# Patient Record
Sex: FEMALE | Race: WHITE | NOT HISPANIC OR LATINO | Employment: FULL TIME | URBAN - METROPOLITAN AREA
[De-identification: names, ages, dates, MRNs, and addresses within clinical notes are randomized per-mention and may not be internally consistent; named-entity substitution may affect disease eponyms.]

---

## 2017-01-03 ENCOUNTER — HOSPITAL ENCOUNTER (EMERGENCY)
Facility: CLINIC | Age: 57
Discharge: HOME | End: 2017-01-03
Attending: EMERGENCY MEDICINE | Admitting: EMERGENCY MEDICINE
Payer: COMMERCIAL

## 2017-01-03 ENCOUNTER — APPOINTMENT (OUTPATIENT)
Dept: GENERAL RADIOLOGY | Facility: CLINIC | Age: 57
End: 2017-01-03
Attending: EMERGENCY MEDICINE
Payer: COMMERCIAL

## 2017-01-03 VITALS
DIASTOLIC BLOOD PRESSURE: 92 MMHG | HEIGHT: 67 IN | BODY MASS INDEX: 28.25 KG/M2 | TEMPERATURE: 98.7 F | SYSTOLIC BLOOD PRESSURE: 149 MMHG | RESPIRATION RATE: 18 BRPM | OXYGEN SATURATION: 96 % | WEIGHT: 180 LBS

## 2017-01-03 DIAGNOSIS — S52.614A CLOSED NONDISPLACED FRACTURE OF STYLOID PROCESS OF RIGHT ULNA, INITIAL ENCOUNTER: ICD-10-CM

## 2017-01-03 DIAGNOSIS — S52.551A OTHER CLOSED EXTRA-ARTICULAR FRACTURE OF DISTAL END OF RIGHT RADIUS, INITIAL ENCOUNTER: ICD-10-CM

## 2017-01-03 PROCEDURE — 99284 EMERGENCY DEPT VISIT MOD MDM: CPT | Mod: 25

## 2017-01-03 PROCEDURE — 25000132 ZZH RX MED GY IP 250 OP 250 PS 637: Performed by: EMERGENCY MEDICINE

## 2017-01-03 PROCEDURE — 73110 X-RAY EXAM OF WRIST: CPT | Mod: RT

## 2017-01-03 PROCEDURE — 29125 APPL SHORT ARM SPLINT STATIC: CPT | Mod: RT

## 2017-01-03 RX ORDER — OXYCODONE AND ACETAMINOPHEN 5; 325 MG/1; MG/1
1 TABLET ORAL ONCE
Status: COMPLETED | OUTPATIENT
Start: 2017-01-03 | End: 2017-01-03

## 2017-01-03 RX ORDER — IBUPROFEN 600 MG/1
600 TABLET, FILM COATED ORAL ONCE
Status: COMPLETED | OUTPATIENT
Start: 2017-01-03 | End: 2017-01-03

## 2017-01-03 RX ORDER — DOCUSATE SODIUM 100 MG/1
100 CAPSULE, LIQUID FILLED ORAL 2 TIMES DAILY
Qty: 20 CAPSULE | Refills: 0 | Status: SHIPPED | OUTPATIENT
Start: 2017-01-03 | End: 2020-03-23

## 2017-01-03 RX ORDER — OXYCODONE AND ACETAMINOPHEN 5; 325 MG/1; MG/1
1 TABLET ORAL EVERY 6 HOURS PRN
Qty: 15 TABLET | Refills: 0 | Status: SHIPPED | OUTPATIENT
Start: 2017-01-03 | End: 2020-03-23

## 2017-01-03 RX ADMIN — IBUPROFEN 600 MG: 600 TABLET ORAL at 21:54

## 2017-01-03 RX ADMIN — OXYCODONE HYDROCHLORIDE AND ACETAMINOPHEN 1 TABLET: 5; 325 TABLET ORAL at 22:36

## 2017-01-03 NOTE — ED AVS SNAPSHOT
[facility] Emergency Department    [address]    [facility] [address]    Phone:  [phone]    Fax:  [phone]                                       Kassidy Guajardo   MRN: [id number]    Department:  [facility] Emergency Department   Date of Visit:  1/3/2017           After Visit Summary Signature Page     I have received my discharge instructions, and my questions have been answered. I have discussed any challenges I see with this plan with the nurse or doctor.    ..........................................................................................................................................  Patient/Patient Representative Signature      ..........................................................................................................................................  Patient Representative Print Name and Relationship to Patient    ..................................................               ................................................  Date                                            Time    ..........................................................................................................................................  Reviewed by Signature/Title    ...................................................              ..............................................  Date                                                            Time

## 2017-01-03 NOTE — ED AVS SNAPSHOT
[facility] Emergency Department    [address]    [facility] [address]    Phone:  [phone]    Fax:  [phone]                                       Kassidy Guajardo   MRN: [id number]    Department:  [facility] Emergency Department   Date of Visit:  1/3/2017           Patient Information     Date Of Birth          1960        Your diagnoses for this visit were:     Other closed extra-articular fracture of distal end of right radius, initial encounter     Closed nondisplaced fracture of styloid process of right ulna, initial encounter        You were seen by Jovi Schofield MD.      Follow-up Information     Schedule an appointment as soon as possible for a visit with Celestino Lilly MD.    Specialty:  Orthopedics    Contact information:    [facility]  [address]  [facility] [address]  [phone]          Discharge Instructions         Forearm Fracture  You have a break or fracture of both bones in the forearm. The bones are not out of place and will not need to be set. This fracture usually takes 6 to 8 weeks to heal completely. Initial treatment is with a splint or cast.    Home care    Keep your arm elevated to reduce pain and swelling. When sitting or lying down elevate your arm above the level of your heart. You can do this by placing your arm on a pillow that rests on your chest or on a pillow at your side. This is most important during the first 48 hours after injury.    Apply an ice pack over the injured area for 15 to 20 minutes every 3 to 6 hours. You should do this for the first 24 to 48 hours. You can make an ice pack by filling a plastic bag that seals at the top with ice cubes and then wrapping it with a thin towel. Be careful not to injure your skin with the ice treatments. Ice should never be applied directly to skin. As the ice melts, be careful that the cast or splint doesn t get wet. You can place the ice pack  inside the sling and directly over the splint or cast. Continue with ice packs as needed for the relief of pain and swelling.    Keep the cast or splint completely dry at all times. Bathe with your cast or splint out of the water. Protect it with 2 large plastic bags, one outside of the other, each taped with duct tape at the top end. If a fiberglass splint or cast gets wet, you can dry it with a hair dryer on a cool setting.    You may use over-the-counter pain medicine to control pain, unless another pain medicine was prescribed. If you have chronic liver or kidney disease or ever had a stomach ulcer or GI bleeding, talk with your healthcare provider before using these medicines.  Follow-up care  Follow up with your healthcare provider, or as advised. If a splint was applied, it may be changed to a cast during your follow-up visit.  If X-rays were taken, you will be told of any new findings that may affect your care.  When to seek medical advice  Call your healthcare provider right away if any of the following occur:    The plaster cast or splint becomes wet or soft    The fiberglass cast or splint remains wet for more than 24 hours    Increased tightness, looseness, or pain occurs under the cast or splint    Fingers become swollen, cold, blue, numb or tingly    The cast develops a foul odor    7890-3876 [employer]. [address] [place] [address]. All rights reserved. This information is not intended as a substitute for professional medical care. Always follow your healthcare professional's instructions.          24 Hour Appointment Hotline       To make an appointment at any [facility], call [phone] ([phone]). If you don't have a family doctor or clinic, we will help you find one. [facility] clinics are conveniently located to serve the needs of you and your family.             Review of your medicines      START taking        Dose / Directions Last dose taken     docusate sodium 100 MG capsule   Commonly known as:  COLACE   Dose:  100 mg   Quantity:  20 capsule        Take 1 capsule (100 mg) by mouth 2 times daily   Refills:  0        oxyCODONE-acetaminophen 5-325 MG per tablet   Commonly known as:  PERCOCET   Dose:  1 tablet   Quantity:  15 tablet        Take 1 tablet by mouth every 6 hours as needed for moderate to severe pain   Refills:  0                Prescriptions were sent or printed at these locations (2 Prescriptions)                   Other Prescriptions                Printed at Department/Unit printer (2 of 2)         oxyCODONE-acetaminophen (PERCOCET) 5-325 MG per tablet               docusate sodium (COLACE) 100 MG capsule                Procedures and tests performed during your visit     Wrist XR, G/E 3 views, right      Orders Needing Specimen Collection     None      Pending Results     No orders found from 1/2/2017 to 1/4/2017.            Pending Culture Results     No orders found from 1/2/2017 to 1/4/2017.       Test Results from your hospital stay           1/3/2017 11:06 PM - Interface, Radiant Ib      Narrative     WRIST RIGHT THREE OR MORE VIEWS  1/3/2017 10:07 PM     COMPARISON: None    HISTORY: Wrist injury.        Impression     IMPRESSION: There is a transverse, mildly dorsally displaced fracture  through the distal metaphysis of the right radius. There is a  nondisplaced fracture through the base of the right ulnar styloid. No  other fractures noted.    BONNY VERDUZCO MD                Clinical Quality Measure: Blood Pressure Screening     Your blood pressure was checked while you were in the emergency department today. The last reading we obtained was  BP: (!) 151/101 mmHg . Please read the guidelines below about what these numbers mean and what you should do about them.  If your systolic blood pressure (the top number) is less than 120 and your diastolic blood pressure (the bottom number) is less than 80, then your blood pressure is normal.  "There is nothing more that you need to do about it.  If your systolic blood pressure (the top number) is 120-139 or your diastolic blood pressure (the bottom number) is 80-89, your blood pressure may be higher than it should be. You should have your blood pressure rechecked within a year by a primary care provider.  If your systolic blood pressure (the top number) is 140 or greater or your diastolic blood pressure (the bottom number) is 90 or greater, you may have high blood pressure. High blood pressure is treatable, but if left untreated over time it can put you at risk for heart attack, stroke, or kidney failure. You should have your blood pressure rechecked by a primary care provider within the next 4 weeks.  If your provider in the emergency department today gave you specific instructions to follow-up with your doctor or provider even sooner than that, you should follow that instruction and not wait for up to 4 weeks for your follow-up visit.        Thank you for choosing [facility]       Thank you for choosing [facility] for your care. Our goal is always to provide you with excellent care. Hearing back from our patients is one way we can continue to improve our services. Please take a few minutes to complete the written survey that you may receive in the mail after you visit with us. Thank you!        [employer] Information     "[employer] lets you send messages to your doctor, view your test results, renew your prescriptions, schedule appointments and more. To sign up, go to [email or web address] . Click on \"Log in\" on the left side of the screen, which will take you to the Welcome page. Then click on \"Sign up Now\" on the right side of the page.     You will be asked to enter the access code listed below, as well as some personal information. Please follow the directions to create your username and password.     Your access code is: [email or web address]  Expires: 4/3/2017 10:55 PM     Your access code will  in 90 days. " If you need help or a new code, please call your [facility] clinic or [phone].        Care EveryWhere ID     This is your Care EveryWhere ID. This could be used by other organizations to access your [facility] medical records  [id number]        After Visit Summary       This is your record. Keep this with you and show to your community pharmacist(s) and doctor(s) at your next visit.

## 2017-01-04 NOTE — ED NOTES
A&Ox4. ABC's intact. Pt c/o right wrist pain after falling on the ice.  Pain 8/10 at this time. Denies numbness/tingling.  CMS intact. Occurred at 2115

## 2017-01-04 NOTE — ED PROVIDER NOTES
"  History     Chief Complaint:  Wrist Pain      HPI   Kassidy Guajardo is a 56 year old female who presents with right wrist pain after a mechanical fall. The patient reports tonight one hour ago while taking out the trash she fell onto her right wrist. Since that time the patient has had wrist wrist pain radiating into her forearm. She was concerned given her pain which prompts her visit. She denies other pain or injury.     Allergies:  The patient has no known allergies to medications.      Medications:    The patient is not currently taking any prescribed medications.     Past Medical History:    CLL    Past Surgical History:    GI surgery    Family History:    The patient has no pertinent family history.     Social History:  .  The patient denies smoking.   The patient consumes alcohol.      Review of Systems   Musculoskeletal:        Positive for right wrist pain.    All other systems reviewed and are negative.    Physical Exam   First Vitals:  BP: (!) 146/102 mmHg  Heart Rate: 89  Temp: 98.7  F (37.1  C)  Resp: 18  Height: 170.2 cm (5' 7\")  Weight: 81.647 kg (180 lb)  SpO2: 100 %    Physical Exam   Vital signs and nursing notes reviewed.     Constitutional: laying on gurney appears comfortable  HENT: No evidence of facial or head injury.    Eyes: Conjunctivae are normal bilaterally. Pupils equal  Neck: normal range of motion  Cardiovascular: Normal rate.    Pulmonary/Chest: No respiratory distress.   Musculoskeletal: Tenderness and swelling at the right distal radius and ulnar area dorsally. Normal cap refill and sensation in all digits. No significant pain noted at elbow with range of motion.   Neurological: Alert and oriented. No focal weakness  Skin: Skin is warm and dry. No rash noted.   Psych: normal affect     Emergency Department Course   Imaging:  Radiographic findings were communicated with the patient who voiced understanding of the findings.    XR Right Wrist:   There is a transverse, mildly " dorsally displaced fracture  through the distal metaphysis of the right radius. There is a  nondisplaced fracture through the base of the right ulnar styloid. No  other fractures noted.    Interventions:  Advil 600 mg tablet  Percocet 1 tablet    Emergency Department Course:  Nursing notes and vitals reviewed.  I performed an exam of the patient as documented above.     The above imaging study(s) were ordered with results noted above.     The patient received the intervention(s) above.     The patient was placed in a splint.    Findings and plan explained to the Patient. Patient discharged home with instructions regarding supportive care, medications, and reasons to return. The importance of close follow-up was reviewed.     Impression & Plan    Medical Decision Making:  Kassidy Guajardo is a 56 year old female who presents for evaluation of wrist pain after fall. CMS is intact distally in the extremity.  Xrays reveal a fracture that does not need reduction at this time.  The patient/family understand that this may change with time and orthopedic follow-up is indicated.  There is no indication for ortho consultation from the ED. Rather, close follow-up is indicated in the next days.  Splint and fracture precautions for home.  The patients head to toe trauma exam is otherwise normal at this time and no further trauma workup is needed as I believe there is no signs of serious head, neck, chest, spinal, extremity or abdominal injuries.      This appears to likely be a non-operative injury and unless further angulation with time or unrelenting pain, definitive fracture care is provided.  However I recommended  to follow up with orthopedic hand specialist for reevaluation.    Diagnosis:    ICD-10-CM    1. Other closed extra-articular fracture of distal end of right radius, initial encounter S52.551A    2. Closed nondisplaced fracture of styloid process of right ulna, initial encounter S52.614A         Disposition:  Discharged home with .     Discharge Medications:  New Prescriptions    DOCUSATE SODIUM (COLACE) 100 MG CAPSULE    Take 1 capsule (100 mg) by mouth 2 times daily    OXYCODONE-ACETAMINOPHEN (PERCOCET) 5-325 MG PER TABLET    Take 1 tablet by mouth every 6 hours as needed for moderate to severe pain         Elkin ANAND, am serving as a scribe at 11:10 PM on 1/3/2017 to document services personally performed by Dr. Schofield, based on my observations and the provider's statements to me.    [facility] EMERGENCY DEPARTMENT        Jovi Schofield MD  01/04/17 0422

## 2017-01-04 NOTE — DISCHARGE INSTRUCTIONS
Forearm Fracture  You have a break or fracture of both bones in the forearm. The bones are not out of place and will not need to be set. This fracture usually takes 6 to 8 weeks to heal completely. Initial treatment is with a splint or cast.    Home care    Keep your arm elevated to reduce pain and swelling. When sitting or lying down elevate your arm above the level of your heart. You can do this by placing your arm on a pillow that rests on your chest or on a pillow at your side. This is most important during the first 48 hours after injury.    Apply an ice pack over the injured area for 15 to 20 minutes every 3 to 6 hours. You should do this for the first 24 to 48 hours. You can make an ice pack by filling a plastic bag that seals at the top with ice cubes and then wrapping it with a thin towel. Be careful not to injure your skin with the ice treatments. Ice should never be applied directly to skin. As the ice melts, be careful that the cast or splint doesn t get wet. You can place the ice pack inside the sling and directly over the splint or cast. Continue with ice packs as needed for the relief of pain and swelling.    Keep the cast or splint completely dry at all times. Bathe with your cast or splint out of the water. Protect it with 2 large plastic bags, one outside of the other, each taped with duct tape at the top end. If a fiberglass splint or cast gets wet, you can dry it with a hair dryer on a cool setting.    You may use over-the-counter pain medicine to control pain, unless another pain medicine was prescribed. If you have chronic liver or kidney disease or ever had a stomach ulcer or GI bleeding, talk with your healthcare provider before using these medicines.  Follow-up care  Follow up with your healthcare provider, or as advised. If a splint was applied, it may be changed to a cast during your follow-up visit.  If X-rays were taken, you will be told of any new findings that may affect your  care.  When to seek medical advice  Call your healthcare provider right away if any of the following occur:    The plaster cast or splint becomes wet or soft    The fiberglass cast or splint remains wet for more than 24 hours    Increased tightness, looseness, or pain occurs under the cast or splint    Fingers become swollen, cold, blue, numb or tingly    The cast develops a foul odor    5305-7145 [employer]. [address] [place] [address], [place] [id number]. All rights reserved. This information is not intended as a substitute for professional medical care. Always follow your healthcare professional's instructions.

## 2018-04-19 ENCOUNTER — HOSPITAL ENCOUNTER (OUTPATIENT)
Dept: ULTRASOUND IMAGING | Facility: CLINIC | Age: 58
Discharge: HOME | End: 2018-04-19
Attending: INTERNAL MEDICINE | Admitting: INTERNAL MEDICINE
Payer: COMMERCIAL

## 2018-04-19 DIAGNOSIS — C91.10 CHRONIC LYMPHOCYTIC LEUKEMIA (H): ICD-10-CM

## 2018-04-19 PROCEDURE — 88173 CYTOPATH EVAL FNA REPORT: CPT | Mod: 26 | Performed by: RADIOLOGY

## 2018-04-19 PROCEDURE — 25000125 ZZHC RX 250: Performed by: RADIOLOGY

## 2018-04-19 PROCEDURE — 00000102 ZZHCL STATISTIC CYTO WRIGHT STAIN TC: Performed by: RADIOLOGY

## 2018-04-19 PROCEDURE — 76942 ECHO GUIDE FOR BIOPSY: CPT

## 2018-04-19 PROCEDURE — 88173 CYTOPATH EVAL FNA REPORT: CPT | Performed by: RADIOLOGY

## 2018-04-19 RX ADMIN — LIDOCAINE HYDROCHLORIDE 5 ML: 10 INJECTION, SOLUTION EPIDURAL; INFILTRATION; INTRACAUDAL; PERINEURAL at 15:09

## 2018-04-19 NOTE — PROGRESS NOTES
Left thyroid nodule FNA consent obtained by Dr. Ruiz,  Pt tolerated procedure well. Sterile dressing to site. DC site care instructions understood.  DC ambulatory to home per self with no evident complications noted.

## 2018-04-21 LAB — COPATH REPORT: NORMAL

## 2020-03-10 ENCOUNTER — TRANSFERRED RECORDS (OUTPATIENT)
Dept: HEALTH INFORMATION MANAGEMENT | Facility: CLINIC | Age: 60
End: 2020-03-10

## 2020-03-18 ENCOUNTER — TRANSFERRED RECORDS (OUTPATIENT)
Dept: HEALTH INFORMATION MANAGEMENT | Facility: CLINIC | Age: 60
End: 2020-03-18

## 2020-03-23 PROBLEM — G24.4 OROMANDIBULAR DYSTONIA: Status: ACTIVE | Noted: 2020-03-23

## 2020-03-24 ENCOUNTER — TELEPHONE (OUTPATIENT)
Dept: NEUROLOGY | Facility: CLINIC | Age: 60
End: 2020-03-24

## 2020-03-24 DIAGNOSIS — G24.4 OROMANDIBULAR DYSTONIA: Primary | ICD-10-CM

## 2020-03-24 NOTE — TELEPHONE ENCOUNTER
botulinum toxin type A (BOTOX) 100 units injection 200 Units  200 Units  SEE ADMIN INSTRUCTIONS  3/24/2020   --    Admin Instructions: 800 units per year given as 200 units every 3 months.      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 No

## 2020-03-26 NOTE — TELEPHONE ENCOUNTER
Benefits of Botox would be to decrease your jaw opening and deviation. We would not be able to treat your mouth movements or jaw movements due to trouble swallowing.Side effects associated with oromandibular injections include soreness or bleeding at the injection site, weakness of jaw closure, trouble chewing tough foods such as meats.   Kassidy asked if she could have the phone on speaker so her  can hear, I told her this is okay. I informed them that they will consult with Dr. Diaz in May. Due to COVID19 and the uncertainty we have preauthorized Botox with your insurance. You may get the injections at the initial appointment if you would like to. You do not have to get them at this time and can do a the consult and think about treatment options more. I spoke with Kassidy and Santiago at length and in detail about how Botox works, side effects, and the EMG guidance used for accuracy. There biggest concern is Botox causing her jaw to paralyze and stay open. I reassured them that Dr. Diaz starts with low doses first to see how you respond. This helps avoid side effects. Education provided that our goal is to weaken the muscle and not paralyze it completely.    Kassidy would also like to go over again medication manage options. She reports Dr. Mcfarlane (head, neck and face specialist) had her try benadryl which did not work.    25 minute phone call

## 2020-04-01 ENCOUNTER — DOCUMENTATION ONLY (OUTPATIENT)
Dept: CARE COORDINATION | Facility: CLINIC | Age: 60
End: 2020-04-01

## 2020-05-08 DIAGNOSIS — G24.4 OROMANDIBULAR DYSTONIA: Primary | ICD-10-CM

## 2020-05-13 ENCOUNTER — PRE VISIT (OUTPATIENT)
Dept: NEUROLOGY | Facility: CLINIC | Age: 60
End: 2020-05-13

## 2020-05-13 NOTE — TELEPHONE ENCOUNTER
FUTURE VISIT INFORMATION      FUTURE VISIT INFORMATION:    Date: 5/15/2020    Time: 1230pm    Location: [facility]  REFERRAL INFORMATION:    Referring provider:  Dr. Farris     Referring providers clinic:  [facility]     Reason for visit/diagnosis  Oromandibular Dystonia     RECORDS REQUESTED FROM:       Clinic name Comments Records Status Imaging Status   Internal Dr. Farris 3/23/2020 [facility] N/A

## 2020-05-15 ENCOUNTER — VIRTUAL VISIT (OUTPATIENT)
Dept: NEUROLOGY | Facility: CLINIC | Age: 60
End: 2020-05-15
Attending: PSYCHIATRY & NEUROLOGY
Payer: COMMERCIAL

## 2020-05-15 DIAGNOSIS — G24.4 OROMANDIBULAR DYSTONIA: ICD-10-CM

## 2020-05-15 NOTE — PROGRESS NOTES
"GENETIC COUNSELING-Neurology    Kassidy Guajardo is a 60 year old female who is being evaluated via a billable telephone visit.      The patient has been notified of following:     \"This telephone visit will be conducted via a call between you and your physician/provider. We have found that certain health care needs can be provided without the need for a physical exam.  This service lets us provide the care you need with a short phone conversation.  If a prescription is necessary we can send it directly to your pharmacy.  If lab work is needed we can place an order for that and you can then stop by our lab to have the test done at a later time.    Telephone visits are billed at different rates depending on your insurance coverage. During this emergency period, for some insurers they may be billed the same as an in-person visit.  Please reach out to your insurance provider with any questions.    If during the course of the call the physician/provider feels a telephone visit is not appropriate, you will not be charged for this service.\"    Patient has given verbal consent for Telephone visit? Yes    What phone number would you like to be contacted at? cell        Phone call duration: 50 minutes (start 12:30 end 1:20PM)    GENETIC COUNSELING-Neurology  I spoke with Kassidy for 50 minutes. She was referred for evaluation of possible oromandibular dystonia.  Dr. Farris asked that I meet with her to review genetic testing options.    MEDICAL HISTORY  Please see Dr. Farris's clinic note. She reports that she has been evaluated for either oromandibular dystonia or TMJ in the past. She is scheduled in June for an evaluation with Dr. Diaz.    FAMILY HISTORY-see scanned pedigree  1. Kassidy reported no significant family history  Of neurologic disease.    GENETIC COUNSELING-  We discussed the genetic and environmental basis of neurologic disease. I explained that in most cases, it arises from complex interaction of multiple " genetic and environmental factors. In some rare cases, there may be a single gene cause.  I explained that her later age of onset and negative family history would argue that it is not extremely likely that we would identify a single gene basis.  However, we also reviewed the fact that a negative family history cannot definitively exclude a possible genetic diagnosis due to de lizbeth mutations, reduced penetrance, recessive inheritance etc.  I explained that if we did this testing and we identified a genetic diagnosis, this would provide diagnostic certainty and might also indicate that other family members are at risk.  I indicated that a negative genetic test would not alter a clinical diagnosis (I.e. a negative genetic test for dystonia does not mean that she doesn't have dystonia). I explained that the genetic testing is looking for rare underlying causes of dystonia- but that clinical diagnosis such as 'dystonia' or 'dyskinesia' are based upon the clinical exam and history.  We discussed risks, benefits, limitations, and utility of genetic testing. I explained that probably the most affordable route to do this testing would be to do the patient pay option through [employer].  Kassidy indicated that she would like to think about the testing and consider whether this information is going to be helpful for her.  She will call me back if she decides to pursue this testing.    Bowen Estrada MS, [facility]  Licensed Genetic Counselor

## 2020-06-16 ENCOUNTER — VIRTUAL VISIT (OUTPATIENT)
Dept: NEUROLOGY | Facility: CLINIC | Age: 60
End: 2020-06-16
Attending: PSYCHIATRY & NEUROLOGY
Payer: COMMERCIAL

## 2020-06-16 DIAGNOSIS — G24.4 OROMANDIBULAR DYSTONIA: Primary | ICD-10-CM

## 2020-06-16 RX ORDER — IBUPROFEN 200 MG
200 TABLET ORAL EVERY 4 HOURS PRN
COMMUNITY

## 2020-06-16 NOTE — LETTER
"2020       RE: Kassidy Guajardo  [address] [facility] [address]     Dear Colleague,    Thank you for referring your patient, Kassidy Guajardo, to the [facility] at [facility]. Please see a copy of my visit note below.    Kassidy Guajardo is a 60 year old female who is being evaluated via a billable video visit.      The patient has been notified of following:     \"This video visit will be conducted via a call between you and your physician/provider. We have found that certain health care needs can be provided without the need for an in-person physical exam.  This service lets us provide the care you need with a video conversation.  If a prescription is necessary we can send it directly to your pharmacy.  If lab work is needed we can place an order for that and you can then stop by our lab to have the test done at a later time.    Video visits are billed at different rates depending on your insurance coverage.  Please reach out to your insurance provider with any questions.    If during the course of the call the physician/provider feels a video visit is not appropriate, you will not be charged for this service.\"    Patient has given verbal consent for Video visit? Yes    Will anyone else be joining your video visit? No        Video-Visit Details    Type of service:  Video Visit    Video Start Time: refer below  Video End Time: refer below    Originating Location (pt. Location): Home    Distant Location (provider location):  [facility]UR[facility]     Platform used for Video Visit: Malachi Pierre DO          MOVEMENT DISORDERS TELEMEDICINE (video and/or telephone) VISIT:     PATIENT: Kassidy Guajardo    : 1960    DATE: 2020    REASON FOR VISIT: new patient for evaluation of involuntary abnormal jaw/mouth movements.    After a review of the patient s situation, this visit was changed from an in-person visit to a Telemedicine visit to reduce " the risk of COVID-19 exposure. The patient is being evaluated via a billable Telemedicine visit.    Ms. Guajardo is a 60 year old R handed female with PMH of Chronic lymphocytic leukemia that presents via Telemedicine as a new patient for evaluation of involuntary abnormal jaw/mouth movements.      History obtained from patient. Patient reports symptoms of abnormal movements that noticed around September 2019.  These movements are described as involuntary and irregular movements involving jaw opening with difficulty biting down and chewing.  She also observes frequent movements of her mouth and tongue.  Prior to these movements, in June 2017, she reports being plagued by multiple cavities resulting in chipped teeth that were constantly sharp and would rechip creating a constant uncomfortable feeling in her mouth.  She worked around this discomfort by constantly chewing gum.  She reports that around this time she had no insurance and could not fix her teeth.  Around November 2019, she sought evaluation for her teeth and cavities and underwent to crown placements of the affected teeth (1 tooth in the left lower jaw, one tooth and right upper jaw).  She had hoped that fixing her teeth would improve the abnormal movements, but it did not.  In January 2019, she sought evaluation for these abnormal movements and was told she had TMJ and referred to Dr. Peters.  TMJ was ruled out by Dr. Peters and the patient was referred to Dr. Mcfarlane  (head, neck face specialist).  Dr. Mcfarlane recommended treatment with Benadryl 50 mg every 8 hours which the patient did for 3 days.  She did not notice any improvement of the oral and lingual movements.  Currently, she reports that her mouth just opens without control.  Her tongue will stick out involuntarily.  Hard to eat and chew and bite, has to prompt herself to bite down. These movements are worse when she is anxious and while multitasking and sometimes when she is resting such  as when she is watching TV.  She says that alcohol makes this worse but she has stopped drinking alcohol.  These movements do not occur in her sleep.  She denies any other movements involving other body parts, including her neck, arms, torso, legs.  These movements are improved temporarily with hypnosis and deep belly breathing.She has worked with PT at [facility], and this helps temporarily.  She continues to do with physical therapy exercises at home.  She denies treatment with antipsychotics or medication such as Reglan.    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  She does note that her job is stressful at times.  She denies and no point or sensory tricks that improve these movements.    She has a past diagnosis of CLL that has never needed treatment and has regular follow-ups for this.     The patient was seen on 5/15/2020 by Dr. Bowen Estrada, , for evaluation of genetic testing options for the condition of oromandibular dystonia. The patient was not certain if she wanted to pursue this route and will contact Dr. Estrada if she would like to continue further with genetic testing.     I have reviewed and updated the patient's Past Medical History, Social History, Family History and Medication List.    Medications:  Current Outpatient Medications   Medication Sig Dispense Refill     famotidine (PEPCID) 20 MG tablet Take 20 mg by mouth daily        Allergies:  Patient has no known allergies.    Physical Exam:  GENERAL: alert, active, attentive, appropriately groomed   HEENT: normocephalic, eyes open with no discharge  CHEST: non labored breathing   PSYCH: mood stable     Neurologic Exam:  MENTAL STATUS: Alert and oriented to person, place, time, and situation. Follows commands. Recent and remote memory intact. Attention span and concentration intact. Fund of knowledge  intact to current events. Able to recall 3/3 objects. Able to recall current president and past presidents until Lorenzana Jr. Able to spell WORLD backwards. Able to name an object and describe its function (pen).  SPEECH: Fluent, intact comprehension and articulation  CN: visual fields intact, EOMIB,  no nystagmus, normal saccades, facial movement symmetric, hearing grossly intact to conversation, tongue protrudes midline   MOTOR: Moves all extremities equally against gravity without difficulty  Involuntary movements:  Frequent involntary Jaw opening, contractions of left corner of mouth, pursing lips, tongue protrusion. Did not seem distractable.  No abnormal movements of neck or head  Agility: Normal finger tapping and open/close fist b/l  COORDINATION: no dysmetria with FTN  GAIT: able to rise unassisted from a seated position, normal arm swing and normal stride length, no en bloc turns, no ataxia    Assessment:  Kassidy Guajardo is a 60 year old female with abnormal involuntary movements of her jaw, mouth, and tongue that she began to notice around 11/2019. Prior to the development of these abnormal movements she described several months of dealing with a cracked/chipped teeth secondary to dental carries in which regularly chewed gum to mitigate the pain/discomfort from her chipped teeth. She underwent repair of her cavities/teetch and had crowns placed which she thought would improve the mouth movements but it did not. She denies exposure to dopamine blocking agents. 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 We addressed treatment of oromandibular dystonia with botluinum toxin injections and also discussed side effects of this procedure. She in interested in continuing forward with this. We also briefly mentioned medications and DBS as treatment options.    1.  Idiopathic oromandibular dystonia, specifically with jaw opening, secondary to change in dentition (multiple dental carries and teeth erosion)    Plan:   - Follow up for botulinum toxin injections with Dr. Diaz once prior authorization is complete.   - Continue to manage stress and anxiety with relaxation techniques and regular exercise.   - Continue daily stretches and strengthening exercises provided by Physical Therapy.     Lauren Pierre DO  Movement Disorders Fellow    I have reviewed the note as documented above.  This accurately captures the substance of my conversation with the patient.    Nitin Diaz MD    Time was a clayton factor in today's visit, and greater than 50% of this 69 minute visit was spent discussing the therapeutic plan, counseling, and coordinating care.  20 minutes were spent in documentation review on June 16, 2020.    Contact time:  Call Started at 8:31 am  Call Ended at 9:40 am    Patient seen and plan of care discussed with Dr. Diaz, whom was involved throughout the entirety of this TeleHealth visit.     Lauren Pierre DO  Movement Disorders Fellow  [facility]      Again, thank you for allowing me to participate in the care of your patient.      Sincerely,    Nitin Diaz MD

## 2020-06-16 NOTE — PROGRESS NOTES
MOVEMENT DISORDERS TELEMEDICINE (video and/or telephone) VISIT:     PATIENT: Kassidy Guajardo    : 1960    DATE: 2020    REASON FOR VISIT: new patient for evaluation of involuntary abnormal jaw/mouth movements.    After a review of the patient s situation, this visit was changed from an in-person visit to a Telemedicine visit to reduce the risk of COVID-19 exposure. The patient is being evaluated via a billable Telemedicine visit.    Ms. Guajardo is a 60 year old R handed female with PMH of Chronic lymphocytic leukemia that presents via Telemedicine as a new patient for evaluation of involuntary abnormal jaw/mouth movements.      History obtained from patient. Patient reports symptoms of abnormal movements that noticed around 2019.  These movements are described as involuntary and irregular movements involving jaw opening with difficulty biting down and chewing.  She also observes frequent movements of her mouth and tongue.  Prior to these movements, in 2017, she reports being plagued by multiple cavities resulting in chipped teeth that were constantly sharp and would rechip creating a constant uncomfortable feeling in her mouth.  She worked around this discomfort by constantly chewing gum.  She reports that around this time she had no insurance and could not fix her teeth.  Around 2019, she sought evaluation for her teeth and cavities and underwent to crown placements of the affected teeth (1 tooth in the left lower jaw, one tooth and right upper jaw).  She had hoped that fixing her teeth would improve the abnormal movements, but it did not.  In 2019, she sought evaluation for these abnormal movements and was told she had TMJ and referred to Dr. Peters.  TMJ was ruled out by Dr. Peters and the patient was referred to Dr. Mcfarlane  (head, neck face specialist).  Dr. Mcfarlane recommended treatment with Benadryl 50 mg every 8 hours which the patient did for 3 days.   She did not notice any improvement of the oral and lingual movements.  Currently, she reports that her mouth just opens without control.  Her tongue will stick out involuntarily.  Hard to eat and chew and bite, has to prompt herself to bite down. These movements are worse when she is anxious and while multitasking and sometimes when she is resting such as when she is watching TV.  She says that alcohol makes this worse but she has stopped drinking alcohol.  These movements do not occur in her sleep.  She denies any other movements involving other body parts, including her neck, arms, torso, legs.  These movements are improved temporarily with hypnosis and deep belly breathing.She has worked with PT at [facility], and this helps temporarily.  She continues to do with physical therapy exercises at home.  She denies treatment with antipsychotics or medication such as Reglan.    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  She does note that her job is stressful at times.  She denies and no point or sensory tricks that improve these movements.    She has a past diagnosis of CLL that has never needed treatment and has regular follow-ups for this.     The patient was seen on 5/15/2020 by Dr. Bowen Estrada, , for evaluation of genetic testing options for the condition of oromandibular dystonia. The patient was not certain if she wanted to pursue this route and will contact Dr. Estrada if she would like to continue further with genetic testing.     I have reviewed and updated the patient's Past Medical History, Social History, Family History and Medication List.    Medications:  Current Outpatient Medications   Medication Sig Dispense Refill     famotidine (PEPCID) 20 MG tablet Take 20 mg by mouth daily        Allergies:  Patient has no known allergies.    Physical Exam:  GENERAL:  alert, active, attentive, appropriately groomed   HEENT: normocephalic, eyes open with no discharge  CHEST: non labored breathing   PSYCH: mood stable     Neurologic Exam:  MENTAL STATUS: Alert and oriented to person, place, time, and situation. Follows commands. Recent and remote memory intact. Attention span and concentration intact. Fund of knowledge intact to current events. Able to recall 3/3 objects. Able to recall current president and past presidents until Lorenzana Jr. Able to spell WORLD backwards. Able to name an object and describe its function (pen).  SPEECH: Fluent, intact comprehension and articulation  CN: visual fields intact, EOMIB,  no nystagmus, normal saccades, facial movement symmetric, hearing grossly intact to conversation, tongue protrudes midline   MOTOR: Moves all extremities equally against gravity without difficulty  Involuntary movements:  Frequent involntary Jaw opening, contractions of left corner of mouth, pursing lips, tongue protrusion. Did not seem distractable.  No abnormal movements of neck or head  Agility: Normal finger tapping and open/close fist b/l  COORDINATION: no dysmetria with FTN  GAIT: able to rise unassisted from a seated position, normal arm swing and normal stride length, no en bloc turns, no ataxia    Assessment:  Kassidy Guajardo is a 60 year old female with abnormal involuntary movements of her jaw, mouth, and tongue that she began to notice around 11/2019. Prior to the development of these abnormal movements she described several months of dealing with a cracked/chipped teeth secondary to dental carries in which regularly chewed gum to mitigate the pain/discomfort from her chipped teeth. She underwent repair of her cavities/teetch and had crowns placed which she thought would improve the mouth movements but it did not. She denies exposure to dopamine blocking agents. 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 We addressed treatment of oromandibular dystonia with botluinum toxin injections and also discussed side effects of this procedure. She in interested in continuing forward with this. We also briefly mentioned medications and DBS as treatment options.    1. Idiopathic oromandibular dystonia, specifically with jaw opening, secondary to change in dentition (multiple dental carries and teeth erosion)    Plan:   - Follow up for botulinum toxin injections with Dr. Diaz once prior authorization is complete.   - Continue to manage stress and anxiety with relaxation techniques and regular exercise.   - Continue daily stretches and strengthening exercises provided by Physical Therapy.     Lauren Pierre DO  Movement Disorders Fellow    I have reviewed the note as documented above.  This accurately captures the substance of my conversation with the patient.    Nitin Diaz MD    Time was a clayton factor in today's visit, and greater than 50% of this 69 minute visit was spent discussing the therapeutic plan, counseling, and coordinating care.  20 minutes were spent in documentation review on June 16, 2020.    Contact time:  Call Started at 8:31 am  Call Ended at 9:40 am    Patient seen and plan of care discussed with Dr. Diaz, whom was involved throughout the entirety of this TeleHealth visit.     Lauren Pierre DO  Movement Disorders Fellow  [facility]

## 2020-06-16 NOTE — PROGRESS NOTES
"Kassidy Guajardo is a 60 year old female who is being evaluated via a billable video visit.      The patient has been notified of following:     \"This video visit will be conducted via a call between you and your physician/provider. We have found that certain health care needs can be provided without the need for an in-person physical exam.  This service lets us provide the care you need with a video conversation.  If a prescription is necessary we can send it directly to your pharmacy.  If lab work is needed we can place an order for that and you can then stop by our lab to have the test done at a later time.    Video visits are billed at different rates depending on your insurance coverage.  Please reach out to your insurance provider with any questions.    If during the course of the call the physician/provider feels a video visit is not appropriate, you will not be charged for this service.\"    Patient has given verbal consent for Video visit? Yes    Will anyone else be joining your video visit? No        Video-Visit Details    Type of service:  Video Visit    Video Start Time: refer below  Video End Time: refer below    Originating Location (pt. Location): Home    Distant Location (provider location):  [facility]UR[facility]     Platform used for Video Visit: Malachi Pierre DO        "

## 2020-06-16 NOTE — PROGRESS NOTES
MOVEMENT DISORDERS TELEMEDICINE (video and/or telephone) VISIT:     PATIENT: Kassidy Guajardo    : 1960    DATE: 2020    REASON FOR VISIT: *** follow up / new patient for evaluation of ***.    After a review of the patient s situation, this visit was changed from an in-person visit to a Telemedicine visit to reduce the risk of COVID-19 exposure. The patient is being evaluated via a billable Telemedicine visit.    Ms. Guajardo is a 60 year old ***R/L handed female with PMH of Chronic lymphocytic leukemia that presents via Telemedicine for follow up / as a new patient for evaluation of ***.     The patient's last Telemedicine visit was on ***     History obtained from patient. Patient reports    Antipsychotic use, reglan  Trauma  Dr. Farris 2020  Jaw opening oromandibular dystonia vs Meige vs Tardive dyskinesia vs functional dystonia, with associated dental/teeth problems that she became aware of in 2019, chewing cheek  stress  Sensory tricks  Treated with Benadaryl 50 mg q8h by Dr Mcfarlane (head, neck face specialist) which did not help oral movements.  She has worked with PT at [facility]  Botox with Dr. Diaz for injection into b/l pterygoid, anterior digastric muscles     The patient was seen on 5/15/2020 by Dr. Bowen Estrada, , for evaluation of genetic testing options for the condition of oromandibular dystonia. The patient was not certain if she wanted to pursue this route and will contact Dr. Estrada if she would like to continue further with genetic testing.     I have reviewed and updated the patient's Past Medical History, Social History, Family History and Medication List.    Medications:  Current Outpatient Medications   Medication Sig Dispense Refill     famotidine (PEPCID) 20 MG tablet Take 20 mg by mouth daily          Movement Disorder Medications                                                                                                                                                            Last taken at ***    Allergies:  Patient has no known allergies.    Physical Exam:  GENERAL: alert, active, attentive, appropriately groomed   HEENT: normocephalic, eyes open with no discharge  CHEST: non labored breathing   PSYCH: mood stable     Neurologic Exam:  MENTAL STATUS: Alert and oriented to person, place, time, and situation. Follows commands. Recent and remote memory intact. Attention span and concentration intact. Fund of knowledge intact to current events. Able to recall ***/3 objects. Able to recall current president and past presidents until ***. Able to spell WORLD backwards. Able to name an object and describe its function (pen).  SPEECH: Fluent, intact comprehension and articulation  CN: visual fields intact, EOMIB,  no nystagmus, normal saccades, facial movement symmetric, hearing grossly intact to conversation, tongue protrudes midline   MOTOR: Moves all extremities equally against gravity without difficulty  Involuntary movements:  Agility: Normal finger tapping and toe tapping b/l  Tone: Normal axial and appendicular tone  COORDINATION: no dysmetria with FTN, HTS  GAIT: able to rise unassisted from a seated position, normal arm swing and normal stride length, no en bloc turns, no ataxia    Assessment:  Kassidy Guajardo is a 60 year old female with *** and whose main concern today is ***. We discussed     {visit diagnosis:356730}    Plan:   ***    Follow up in *** months or sooner as needed.    Lauren Pierre,   Movement Disorders Fellow    I have reviewed the note as documented above.  This accurately captures the substance of my conversation with the patient.    Time was a clayton factor in today's visit, and greater than 50% of this *** minute visit was spent discussing the therapeutic plan, counseling, and coordinating care.  *** minutes were spent in documentation review on June 16, 2020.    Contact time:  Call Started at ***  Call Ended at  ***    Patient seen and plan of care discussed with Dr. Diaz, whom was involved throughout the entirety of this TeleHealth visit.     Lauren Pierre DO  Movement Disorders Fellow  [facility]

## 2020-06-16 NOTE — PATIENT INSTRUCTIONS
Today you spoke with Sheeba Diaz and Ignacio.  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 This is diagnosis made clinically and there is not tests to order to confirm this diagnosis. We do NOT suspect you have Parkinson disease or [place] disease. The gold standard treatment for this condition is botulinum toxin injections. This would be for symptomatic treatment and is not curative. We will be injecting specific muscles in your jaw with botulinum toxin to weaken these muscles so as to improve the involuntary movements of your mouth, specifically with jaw opening.  We would avoid injecting botulinum toxin to your tongue and mouth as this has been shown to cause undue side effects such as trouble swallowing and speaking. Botulinum toxin injections are expected to last about 3 months so you will need to return every 3 months for repeat injections. We discussed that the initial set of injections will be a smaller dose of botulinum toxin injections to avoid weakening the muscles too much.  Then each subsequent set of injections will be evaluated for improvement in symptoms or weakness and we will determine whether we need to increase the amount or decrease the amount of botulinum toxin injected. Our goal is to improve the abnormal movements of your jaw, specifically with jaw opening, so that it does not interfere with daily activities such as biting, chewing, speaking. We discussed that stress and anxiety will worsen the movements and it is best to continue with relaxation exercises and techniques.     Plan:   - Follow up for botulinum toxin injections with Dr. Diaz once prior authorization is complete.   - Continue to manage stress and anxiety with relaxation techniques and regular exercise.   - Continue daily stretches and strengthening exercises provided by Physical Therapy.

## 2020-06-19 ENCOUNTER — MYC MEDICAL ADVICE (OUTPATIENT)
Dept: NEUROLOGY | Facility: CLINIC | Age: 60
End: 2020-06-19

## 2020-07-10 ENCOUNTER — TELEPHONE (OUTPATIENT)
Dept: NEUROLOGY | Facility: CLINIC | Age: 60
End: 2020-07-10

## 2020-07-10 NOTE — TELEPHONE ENCOUNTER
[facility] Call Center    Phone Message    May a detailed message be left on voicemail: yes     Reason for Call: Other: Jian Salas Resident Doctor at [facility] calling with request to speak with Dr. Diaz regarding mutual patient, Kassidy. He states she was recently seen at their clinic and patient is interested in seeing them for her Botox injections. Jian is inquiring if Dr. Diaz would be okay with them taking over care for her botox injections. Please give him a call back to discuss. Thank you.      If email contact is preferred Jian Salas can be reached at: [email or web address]     Action Taken: Message routed to:  [facility] ([facility] Neurology    Travel Screening: Not Applicable

## 2020-11-03 ENCOUNTER — THERAPY VISIT (OUTPATIENT)
Dept: SPEECH THERAPY | Facility: CLINIC | Age: 60
End: 2020-11-03
Payer: COMMERCIAL

## 2020-11-03 DIAGNOSIS — G24.4 OROMANDIBULAR DYSTONIA: Primary | ICD-10-CM

## 2020-11-03 DIAGNOSIS — R47.1 DYSARTHRIA: ICD-10-CM

## 2020-11-03 DIAGNOSIS — R13.12 OROPHARYNGEAL DYSPHAGIA: ICD-10-CM

## 2020-11-03 PROCEDURE — 92610 EVALUATE SWALLOWING FUNCTION: CPT | Mod: GN | Performed by: SPEECH-LANGUAGE PATHOLOGIST

## 2020-11-03 PROCEDURE — 92522 EVALUATE SPEECH PRODUCTION: CPT | Mod: GN | Performed by: SPEECH-LANGUAGE PATHOLOGIST

## 2020-11-03 PROCEDURE — 92526 ORAL FUNCTION THERAPY: CPT | Mod: GN | Performed by: SPEECH-LANGUAGE PATHOLOGIST

## 2020-11-03 PROCEDURE — 92507 TX SP LANG VOICE COMM INDIV: CPT | Mod: GN | Performed by: SPEECH-LANGUAGE PATHOLOGIST

## 2020-11-12 ENCOUNTER — VIRTUAL VISIT (OUTPATIENT)
Dept: SPEECH THERAPY | Facility: CLINIC | Age: 60
End: 2020-11-12
Payer: COMMERCIAL

## 2020-11-12 DIAGNOSIS — G24.4 OROMANDIBULAR DYSTONIA: Primary | ICD-10-CM

## 2020-11-12 DIAGNOSIS — R47.1 DYSARTHRIA: ICD-10-CM

## 2020-11-12 PROCEDURE — 92507 TX SP LANG VOICE COMM INDIV: CPT | Mod: GN | Performed by: SPEECH-LANGUAGE PATHOLOGIST

## 2020-11-12 NOTE — PROGRESS NOTES
Kassidy Guajardo is a 60 year old female who is being seen via a billable video visit.      Patient has given verbal consent for Video visit? Yes    Video Start Time: 3:30 PM    Telehealth Visit Details    Type of Service:  Telehealth    Video End Time (time video stopped): 3:52PM    Originating Location (pt. location): Home    Additional Participants in Telehealth Visit: None    [facility]     Mode of Communication (Audio Visual or Audio Only):  Audio and Visiual    Areli Silveira, SLP  November 12, 2020

## 2020-11-17 NOTE — PROGRESS NOTES
Speech-Language Pathology Department   EVALUATION  [facility]      11/03/20 1400   General Information   Type of Evaluation Dysarthria   Type Of Visit Initial   Start Of Care Date 11/03/20   Referring Physician Dr. Jian Marte    Orders Evaluate And Treat   Orders Comment Dysarthria and dysphagia evaluation   Medical Diagnosis Dyspahgia, dysarthria   Onset Of Illness/injury Or Date Of Surgery 10/13/20   Precautions/Limitations  swallowing precautions   Hearing Adequate in quiet setting   Surgical/Medical history reviewed Yes   Pertinent History Of Current Problem Kassidy Guajardo is a 60-year-old woman who has a history of oromandibular dystonia. She is seen today at the request of Dr. Jian Marte who injected Botox into her pterygoid muscles on Oct 13, 2020. Her initial injection was very helpful per her report in managing the dystonia. Unfortunately, this second injection has impaired her resonance and her swallowing function. She was referred to SLP services for clinical swallow evaluation and dysarthria evaluation. She reports chewing was difficult prior to injection and she reports when she tries to bite something off she is unable to approximate her upper and lower incisors.     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    Speech   Deficits in Phonation Breathy quality   S/Z Ratio 0.78   Underlying oral motor deficit oromandibular dystonia   Deficits in Resonance Hypernasal   Deficits in visible velar function Left elevation   Deficits in [place] None   Speech Comments Kassidy demonstrates moderate deficits in speech which are more greatly impacted  "by her decreased resonance. She demonstrates dystonia in her vocal function but hypernasality really impacts her intelligibility. She notes increased difficulty toward the end of the day with muscle fatigue.    Cognitive Status Examination   Cognitive Status Exam Comments She demonstrates appropriate cognitive function for participation in evaluation. Formal cognitive assessments not preformed.    Clinical Impression, SLP Eval   Criteria for Skilled Therapeutic Interventions Met (SLP Eval) yes;treatment indicated   SLP Diagnosis Moderate dysarthria and hypernasality   Influenced by the following factors/impairments post botox injection   Functional limitations due to impairments decreased communication capacity    Therapy Frequency 3 times;per month   Predicted Duration of Therapy Intervention (days/wks) 3-4 months   Risks and Benefits of Treatment have been explained. Yes   Patient, Family & other staff in agreement with plan of care Yes   Clinical Impression Comments Kassidy Guajardo demonstrates moderate dysarthria and hypernasality which impact her functional communication post botox injection. She is impaired with a variety of communication tasks and demonstrates some movement of her velum which could be improved to minimize hypernasality. She continues to demonstrate oromandibular dystonia but notes this is significantly improved post injection. She continues to work with the dental team and her neurologist to manage oromandibular dystonia. She will benefit from speech pathology services to improve velar function and minimize hypernasality along with improving ROM and strength of oral mechanism to compensate for the changes post injection.    Cognitive/Communication Goal 1   Goal Identifier Hypernasality   Goal Description 1. Pt will improve velar movement by completing 10 repetitions of \"[place]\" 3 times daily.    Target Date 02/01/21   Cognitive/Communication Goal 2   Goal Identifier Hypernasality   Goal " Description 2. Pt will improve velar movement by blowing a tissue or paper across the table 100 times per day.    Target Date 02/01/21   Total Session Time   Sound production (artic, phonology, apraxia, dysarthria) Minutes (94514) 20   Total Evaluation Time 20       Thank you for the referral of Kassidy Guajardo. If you have any questions about this report, please contact me using the information below.      Areli Silveira MS, CCC-SLP  Speech-Language Pathology  [facility]  Department of Otolaryngology/D&T - 4th floor  Pager: [phone]  Phone: [phone]  Email: [email or web address]

## 2020-11-17 NOTE — PROGRESS NOTES
Speech-Language Pathology Department   EV[facility]  [facility]      11/03/20 1400   General Information   Type Of Visit Initial   Start Of Care Date 11/03/20   Referring Physician Dr. Jian Marte    Orders Evaluate And Treat   Orders Comment Clinical Swallow Eval   Medical Diagnosis Oromandibular dystonia s/p botox injection   Onset Of Illness/injury Or Date Of Surgery 10/13/20   Precautions/limitations Swallowing Precautions   Hearing WFL   Pertinent History of Current Problem/OT: Additional Occupational Profile Info Kassidy Guajardo is a 60-year-old woman who has a history of oromandibular dystonia. She is seen today at the request of Dr. Jian Marte who injected Botox into her pterygoid muscles on Oct 13, 2020. Her initial injection was very helpful per her report in managing the dystonia. Unfortunately, this second injection has impaired her resonance and her swallowing function. She was referred to SLP services for clinical swallow evaluation and dysarthria evaluation. She reports chewing was difficult prior to injection and she reports when she tries to bite something off she is unable to approximate her upper and lower incisors.  She also reportsincreased difficulty swallowing solids as they get stuck high in her throat. She notes choking is a fear. She also reports nasal reflux with liquids.    Respiratory Status Room air   Prior Level Of Function Swallowing   Prior Level Of Function Comment Regular solids and thin liquids prior to injection   Patient Role/employment History Employed   [facility]   Home/community Accessibility Comments (flowsheet Row) Independent   General Observations Pt highly pleasant and cooperative throughout evaluation,    Patient/family Goals Pt would like to improve swallow function.    Clinical Swallow Evaluation   Oral Musculature generally intact   Dentition present and adequate   Mucosal Quality good    Mandibular Strength and Mobility impaired  (dystonia noted)   Oral Labial Strength and Mobility WFL   Lingual Strength and Mobility WFL   Velar Elevation impaired   Buccal Strength and Mobility impaired   Laryngeal Function Cough;Throat clear;Swallow;Voicing initiated   Clinical Swallow Eval: Thin Liquid Texture Trial   Mode of Presentation, Thin Liquids cup;fed by clinician   Volume of Liquid or Food Presented 4 oz water   Oral Phase of Swallow WFL   Pharyngeal Phase of Swallow intact   Diagnostic Statement No overt clinical s/sx of aspiration/penetration noted on thin liquid trials.    Clinical Swallow Eval: Solid Food Texture Trial   Mode of Presentation, Solid self-fed   Volume of Solid Food Presented [place] cookie   Oral Phase, Solid other (see comments)  (Mildly discoordinated mastication)   Pharyngeal Phase, Solid intact   Diagnostic Statement No overt clinical s/sx of aspiration/penetration.    Swallow Compensations   Swallow Compensations Reduce amounts;Alternate viscosity of consistencies   Educational Assessment   Barriers to Learning No barriers   Preferred Learning Style Listening;Demonstration   Esophageal Phase of Swallow   Patient reports or presents with symptoms of esophageal dysphagia No   General Therapy Interventions   Planned Therapy Interventions Dysphagia Treatment   Dysphagia treatment Oropharyngeal exercise training;Instruction of safe swallow strategies   Swallow Eval: Clinical Impressions   Skilled Criteria for Therapy Intervention Skilled criteria met.  Treatment indicated.   Functional Assessment Scale (FAS) 5   Treatment Diagnosis Mild oropharyngeal dysphagia   Diet texture recommendations Thin liquids;Dysphagia diet level 3   Recommended Feeding/Eating Techniques alternate between small bites and sips of food/liquid;hard swallow w/ each bite or sip;maintain upright posture during/after eating for 30 mins;small sips/bites   Rehab Potential good, to achieve stated therapy goals    Predicted Duration of Therapy Intervention (days/wks) 2-3 times per month for 3 months   Anticipated Discharge Disposition home w/ outpatient services   Risks and Benefits of Treatment have been explained. Yes   Clinical Impression Comments Kassidy Guajardo demonstrates mild oropharyngeal dysphagia as characterized by difficulty with coordination of mastication, reported nasal reflux and pharyngeal residue. She demonstrates no overt clinical s/sx of aspiration/penetration during evaluation. She is able to demonstrate small bites/sips and slow rate. Recommend speech pathology services to address oropharyngeal dysphagia. Recommend also soft solids and thin liquids. Small bites/sips and slow rate. Cut food up into small pieces before puting in her mouth. Encouraged smaller more frequent meals as well.    Swallow Goal 1   Goal Identifier Diet   Goal Description 1. Pt will adhere to safe swallow precautions 100% of the time independently.    Target Date 02/01/21   Swallow Goal 2   Goal Identifier Exercises   Goal Description 2. Pt will improve ROM and strength of pharynx, BOT, velum and jaw by completing 10 repetitions of [place], effortful swallow and VPI exercises.    Target Date 02/01/21   Total Session Time   SLP Eval: oral/pharyngeal swallow function, clinical minutes (81158) 20   Total Evaluation Time 20       Thank you for the referral of Kassidy Guajardo. If you have any questions about this report, please contact me using the information below.      Areli Silveira MS, CCC-SLP  Speech-Language Pathology  [facility]  Department of Otolaryngology/D&T - 4th floor  Pager: [phone]  Phone: [phone]  Email: [email or web address]

## 2020-12-03 ENCOUNTER — VIRTUAL VISIT (OUTPATIENT)
Dept: SPEECH THERAPY | Facility: CLINIC | Age: 60
End: 2020-12-03
Payer: COMMERCIAL

## 2020-12-03 DIAGNOSIS — G24.4 OROMANDIBULAR DYSTONIA: Primary | ICD-10-CM

## 2020-12-03 DIAGNOSIS — R13.12 OROPHARYNGEAL DYSPHAGIA: ICD-10-CM

## 2020-12-03 DIAGNOSIS — R47.1 DYSARTHRIA: ICD-10-CM

## 2020-12-03 PROCEDURE — 92507 TX SP LANG VOICE COMM INDIV: CPT | Mod: GN | Performed by: SPEECH-LANGUAGE PATHOLOGIST

## 2020-12-03 NOTE — PROGRESS NOTES
Kassidy Guajardo is a 60 year old female who is being seen via a billable video visit.      Patient has given verbal consent for Video visit? Yes    Video Start Time: 1534    Telehealth Visit Details    Type of Service:  Telehealth    Video End Time (time video stopped): 1601    Originating Location (pt. location): Home    Additional Participants in Telehealth Visit: None    [facility]     Mode of Communication (Audio Visual or Audio Only):  Audio Visual    Areli Silveira, SLP  December 3, 2020

## 2021-01-14 ENCOUNTER — HEALTH MAINTENANCE LETTER (OUTPATIENT)
Age: 61
End: 2021-01-14

## 2021-01-14 ENCOUNTER — VIRTUAL VISIT (OUTPATIENT)
Dept: SPEECH THERAPY | Facility: CLINIC | Age: 61
End: 2021-01-14
Payer: COMMERCIAL

## 2021-01-14 DIAGNOSIS — R13.12 OROPHARYNGEAL DYSPHAGIA: ICD-10-CM

## 2021-01-14 DIAGNOSIS — R47.1 DYSARTHRIA: ICD-10-CM

## 2021-01-14 DIAGNOSIS — G24.4 OROMANDIBULAR DYSTONIA: Primary | ICD-10-CM

## 2021-01-14 PROCEDURE — 92507 TX SP LANG VOICE COMM INDIV: CPT | Mod: GN | Performed by: SPEECH-LANGUAGE PATHOLOGIST

## 2021-01-14 NOTE — PROGRESS NOTES
Kassidy Guajardo is a 60 year old female who is being seen via a billable video visit.      Patient has given verbal consent for Video visit? Yes    Video Start Time: 1401    Telehealth Visit Details    Type of Service:  Telehealth    Video End Time (time video stopped): 1424    Originating Location (pt. location): Home    Additional Participants in Telehealth Visit: None    [facility]     Mode of Communication (Audio Visual or Audio Only):  Audio and visual    Areli Silveira, SLP  January 14, 2021

## 2021-07-27 ENCOUNTER — OFFICE VISIT (OUTPATIENT)
Dept: NEUROLOGY | Facility: CLINIC | Age: 61
End: 2021-07-27
Payer: COMMERCIAL

## 2021-07-27 VITALS
OXYGEN SATURATION: 98 % | HEART RATE: 82 BPM | RESPIRATION RATE: 16 BRPM | DIASTOLIC BLOOD PRESSURE: 101 MMHG | SYSTOLIC BLOOD PRESSURE: 159 MMHG

## 2021-07-27 DIAGNOSIS — G24.4 OROMANDIBULAR DYSTONIA: Primary | ICD-10-CM

## 2021-07-27 PROCEDURE — 95874 GUIDE NERV DESTR NEEDLE EMG: CPT | Performed by: PSYCHIATRY & NEUROLOGY

## 2021-07-27 PROCEDURE — 64612 DESTROY NERVE FACE MUSCLE: CPT | Performed by: PSYCHIATRY & NEUROLOGY

## 2021-07-27 ASSESSMENT — PAIN SCALES - GENERAL: PAINLEVEL: NO PAIN (0)

## 2021-07-27 NOTE — PROGRESS NOTES
"Movement Disorders Botulinum Toxin Procedure Note    Chief Complaint: Idiopathic oromandibular dystonia with jaw opening    History of Present Illness:  Kassidy Guajardo is a 61 year old female who presents to clinic for botulinum toxin injections for treatment of oromandibular dystonia with jaw opening    She has been receiving Botox injections elsewhere for the past years. She reports that she had her first injection in July 2020 - she had 20 units of Botox into each lateral pterygoid muscles. After this she had partial response so next injection in October 2020 was increased to 30 units each. She had difficulty talking & swallowing. They though inaccurate angulation and height of needle affecting palatal & superior pharyngeal muscles. She also felt congested, worsened through the day. This improved after approximately 1 month. Some improvement with movements. She had her most recent injection in January 2021, some symptoms not as severe only lasting 1.5-2 weeks. She had coffee come out of nose but voice was understandable. Some improvement with movement then but not \"earth-shattering\". She went back in April for another injection but due to frustration with side effects she did not undergo injections in mouth.     Prior to the start of the procedure and with procedural staff participation, I verbally confirmed the patient s identity using two indicators, relevant allergies, that the procedure was appropriate and matched the consent or emergent situation, and that the correct equipment/implants were available. Immediately prior to starting the procedure I conducted the Time Out with the procedural staff and re-confirmed the patient s name, procedure, and site/side. (The Joint Commission universal protocol was followed.)  Yes    TOTAL DOSE ADMINISTERED:  Dose Administered:  80 units Botox    Diluent Used:  Preservative Free Normal Saline  Total Volume of Diluent Used:  2 ml  Lot # [id number] with Expiration Date:  " 11/2023    Medication guide was offered to patient and was accepted.    CONSENT:  The risks, benefits, and treatment options were discussed with Kassidy Guajardo and she agreed to proceed.      Written consent was obtained byYes    EQUIPMENT USED:  Needle-37mm stimulating/recording    SKIN PREPARATION:  Skin preparation was performed using an alcohol wipe.    GUIDANCE DESCRIPTION:  EMG guidance:Yes  Ultrasound guidance:No    AREA/MUSCLE INJECTED:        Muscles Injected Units Injected Number of Injections   Lateral pterygoid, left 30 1   Anterior digastric, left 10 1   Lateral pterygoid, right 30 1   Anterior digastric, left 10 1        Total Units Injected: 80    Unavoidable Waste:     Total Units Billed       The patient tolerated the injections without difficulty.    Summary:    The patient was injected today with  80 units of Botox under EMG  guidance as treatment of idiopathic oromandibular dystonia with jaw opening.  The patient tolerated the procedure well.    Plan  Follow-up in 3 months' time to consider repeat injections    Patient and plan was examined & discussed with attending physician, Dr. Diaz.     Nasreen Guerra MD  Movement Disorder Fellow    I saw the patient and performed the injections.    Nitin Diaz MD

## 2021-07-27 NOTE — LETTER
"7/27/2021       RE: Kassidy Guajardo  [address] [facility] [address]     Dear Colleague,    Thank you for referring your patient, Kassidy Guajardo, to the [facility] [place] [facility]. Please see a copy of my visit note below.    Movement Disorders Botulinum Toxin Procedure Note    Chief Complaint: Idiopathic oromandibular dystonia with jaw opening    History of Present Illness:  Kassidy Guajardo is a 61 year old female who presents to clinic for botulinum toxin injections for treatment of oromandibular dystonia with jaw opening    She has been receiving Botox injections elsewhere for the past years. She reports that she had her first injection in July 2020 - she had 20 units of Botox into each lateral pterygoid muscles. After this she had partial response so next injection in October 2020 was increased to 30 units each. She had difficulty talking & swallowing. They though inaccurate angulation and height of needle affecting palatal & superior pharyngeal muscles. She also felt congested, worsened through the day. This improved after approximately 1 month. Some improvement with movements. She had her most recent injection in January 2021, some symptoms not as severe only lasting 1.5-2 weeks. She had coffee come out of nose but voice was understandable. Some improvement with movement then but not \"earth-shattering\". She went back in April for another injection but due to frustration with side effects she did not undergo injections in mouth.     Prior to the start of the procedure and with procedural staff participation, I verbally confirmed the patient s identity using two indicators, relevant allergies, that the procedure was appropriate and matched the consent or emergent situation, and that the correct equipment/implants were available. Immediately prior to starting the procedure I conducted the Time Out with the procedural staff " and re-confirmed the patient s name, procedure, and site/side. (The Joint Commission universal protocol was followed.)  Yes    TOTAL DOSE ADMINISTERED:  Dose Administered:  80 units Botox    Diluent Used:  Preservative Free Normal Saline  Total Volume of Diluent Used:  2 ml  Lot # [id number] with Expiration Date:  11/2023    Medication guide was offered to patient and was accepted.    CONSENT:  The risks, benefits, and treatment options were discussed with Kassidy Guajardo and she agreed to proceed.      Written consent was obtained byYes    EQUIPMENT USED:  Needle-37mm stimulating/recording    SKIN PREPARATION:  Skin preparation was performed using an alcohol wipe.    GUIDANCE DESCRIPTION:  EMG guidance:Yes  Ultrasound guidance:No    AREA/MUSCLE INJECTED:        Muscles Injected Units Injected Number of Injections   Lateral pterygoid, left 30 1   Anterior digastric, left 10 1   Lateral pterygoid, right 30 1   Anterior digastric, left 10 1        Total Units Injected: 80    Unavoidable Waste:     Total Units Billed       The patient tolerated the injections without difficulty.    Summary:    The patient was injected today with  80 units of Botox under EMG  guidance as treatment of idiopathic oromandibular dystonia with jaw opening.  The patient tolerated the procedure well.    Plan  Follow-up in 3 months' time to consider repeat injections    Patient and plan was examined & discussed with attending physician, Dr. Diaz.     Nasreen Guerra MD  Movement Disorder Fellow    I saw the patient and performed the injections.    Nitin Diaz MD

## 2021-10-19 ENCOUNTER — OFFICE VISIT (OUTPATIENT)
Dept: NEUROLOGY | Facility: CLINIC | Age: 61
End: 2021-10-19
Payer: COMMERCIAL

## 2021-10-19 VITALS
OXYGEN SATURATION: 100 % | SYSTOLIC BLOOD PRESSURE: 146 MMHG | HEART RATE: 72 BPM | DIASTOLIC BLOOD PRESSURE: 87 MMHG | RESPIRATION RATE: 16 BRPM

## 2021-10-19 DIAGNOSIS — G24.4 OROMANDIBULAR DYSTONIA: Primary | ICD-10-CM

## 2021-10-19 PROCEDURE — 64612 DESTROY NERVE FACE MUSCLE: CPT | Mod: 50 | Performed by: PSYCHIATRY & NEUROLOGY

## 2021-10-19 PROCEDURE — 95874 GUIDE NERV DESTR NEEDLE EMG: CPT | Performed by: PSYCHIATRY & NEUROLOGY

## 2021-10-19 ASSESSMENT — PAIN SCALES - GENERAL: PAINLEVEL: MILD PAIN (3)

## 2021-10-19 NOTE — PROGRESS NOTES
Movement Disorders Botulinum Toxin Procedure Note     Chief Complaint: Idiopathic oromandibular dystonia with jaw opening     History of Present Illness:  Kassidy Guajardo is a 61 year old female who presents to clinic for botulinum toxin injections for treatment of oromandibular dystonia with jaw opening  We injected botulinum toxin into the lateral pterygoids using the lateral percutaneous approach on July 27, 2021.  She had previously had an intraoral approach which cause dysphagia.  With these injections she had no dysphagia.  She felt like they work well until about 3 weeks ago.  She would like to try to slightly increase the injections into the lateral pterygoids to get longer duration of effect.  We elected to increase the dose to the lateral pterygoids by 5 units bilaterally.     TOTAL DOSE ADMINISTERED:  Dose Administered:  90 units Botox    Diluent Used:  Preservative Free Normal Saline  Total Volume of Diluent Used:  2 ml  Lot #[id number]  with Expiration Date: 02/29/2024   Medication guide was offered to patient and was accepted.     CONSENT:  The risks, benefits, and treatment options were discussed with Kassidy Guajardo and she agreed to proceed.      Written consent was obtained byYes     EQUIPMENT USED:  Needle-37mm stimulating/recording     SKIN PREPARATION:  Skin preparation was performed using an alcohol wipe.     GUIDANCE DESCRIPTION:  EMG guidance:Yes  Ultrasound guidance:No     AREA/MUSCLE INJECTED:          Muscles Injected Units Injected Number of Injections   Lateral pterygoid, left 35 1   Anterior digastric, left 10 1   Lateral pterygoid, right 35 1   Anterior digastric, left 10 1           Total Units Injected: 90     Unavoidable Waste:       Total Units Billed          The patient tolerated the injections without difficulty.     Summary:    The patient was injected today with  90 units of Botox under EMG  guidance as treatment of idiopathic oromandibular dystonia with jaw opening.  The patient  tolerated the procedure well.     Plan  Follow-up in 3 months' time to consider repeat injections        Nitin Diaz MD

## 2021-10-19 NOTE — LETTER
10/19/2021       RE: Kassidy Guajardo  [address] [facility] [id number]     Dear Colleague,    Thank you for referring your patient, Kassidy Guajardo, to the [facility] [place] [facility]. Please see a copy of my visit note below.    Movement Disorders Botulinum Toxin Procedure Note     Chief Complaint: Idiopathic oromandibular dystonia with jaw opening     History of Present Illness:  Kassidy Guajardo is a 61 year old female who presents to clinic for botulinum toxin injections for treatment of oromandibular dystonia with jaw opening  We injected botulinum toxin into the lateral pterygoids using the lateral percutaneous approach on July 27, 2021.  She had previously had an intraoral approach which cause dysphagia.  With these injections she had no dysphagia.  She felt like they work well until about 3 weeks ago.  She would like to try to slightly increase the injections into the lateral pterygoids to get longer duration of effect.  We elected to increase the dose to the lateral pterygoids by 5 units bilaterally.     TOTAL DOSE ADMINISTERED:  Dose Administered:  90 units Botox    Diluent Used:  Preservative Free Normal Saline  Total Volume of Diluent Used:  2 ml  Lot #[id number]  with Expiration Date: 02/29/2024   Medication guide was offered to patient and was accepted.     CONSENT:  The risks, benefits, and treatment options were discussed with Kassidy Guajardo and she agreed to proceed.      Written consent was obtained byYes     EQUIPMENT USED:  Needle-37mm stimulating/recording     SKIN PREPARATION:  Skin preparation was performed using an alcohol wipe.     GUIDANCE DESCRIPTION:  EMG guidance:Yes  Ultrasound guidance:No     AREA/MUSCLE INJECTED:          Muscles Injected Units Injected Number of Injections   Lateral pterygoid, left 35 1   Anterior digastric, left 10 1   Lateral pterygoid, right 35 1   Anterior digastric, left 10 1            Total Units Injected: 90     Unavoidable Waste:       Total Units Billed          The patient tolerated the injections without difficulty.     Summary:    The patient was injected today with  90 units of Botox under EMG  guidance as treatment of idiopathic oromandibular dystonia with jaw opening.  The patient tolerated the procedure well.     Plan  Follow-up in 3 months' time to consider repeat injections        Nitin Diaz MD           Again, thank you for allowing me to participate in the care of your patient.      Sincerely,    Nitin Diaz MD

## 2021-10-23 ENCOUNTER — HEALTH MAINTENANCE LETTER (OUTPATIENT)
Age: 61
End: 2021-10-23

## 2022-01-16 NOTE — PROGRESS NOTES
"Movement Disorders Botulinum Toxin Clinic Note    Chief Complaint: Idiopathic oromandibular dystonia with jaw opening    History of Present Illness:  Kassidy Guajardo is a 61 year old female who presents to clinic for botulinum toxin injections for treatment of jaw opening oromandibular dystonia.  She is wondering if we are going down the correct treatment path due to limited response.  Her jaw still wants to drop and the mouth open spontaneously.  She has changed her diet to pureed consistency due to difficulty chewing.  She describes this as her \"teeth can't find the food\".  Biting into a sandwich, her mouth will only close so far without a lot of mental effort.  She also feels soreness over the jaw bilaterally.  Has some difficulty with perusing her lips to drink from a glass.    She has already worked with Dr. Vasquez and PT with therapist Lizeth last year.      Response to Last Injection: 10/19/2021    Onset of effect: 2 weeks    Benefit from last injections: Decreased frequency of jaw opening. No change in her chewing symptoms.  She feels that the prior injections were more efficacious.    Wearing off: She noticed wearing off over the past 2 weeks.    Side effects: She reports difficulty with smiling that she noticed in a picture a few weeks later.      Current Outpatient Medications   Medication Sig Dispense Refill     famotidine (PEPCID) 20 MG tablet Take 20 mg by mouth daily as needed        ibuprofen (ADVIL/MOTRIN) 200 MG tablet Take 200 mg by mouth every 4 hours as needed for mild pain         Allergies: She has No Known Allergies.    Past Medical History:   Diagnosis Date     CLL (chronic lymphocytic leukemia) (H)      Oromandibular dystonia 3/23/2020       Past Surgical History:   Procedure Laterality Date     COLONOSCOPY       GI SURGERY       HERNIA REPAIR Right     herniated intestine right       Social History     Socioeconomic History     Marital status:      Spouse name: Not on file     Number " of children: Not on file     Years of education: Not on file     Highest education level: Not on file   Occupational History     Not on file   Tobacco Use     Smoking status: Never Smoker     Smokeless tobacco: Never Used   Substance and Sexual Activity     Alcohol use: Yes     Alcohol/week: 1.0 standard drink     Types: 1 Glasses of wine per week     Drug use: No     Sexual activity: Not on file   Other Topics Concern     Not on file   Social History Narrative     Not on file     Social Determinants of Health     Financial Resource Strain: Not on file   Food Insecurity: Not on file   Transportation Needs: Not on file   Physical Activity: Not on file   Stress: Not on file   Social Connections: Not on file   Intimate Partner Violence: Not on file   Housing Stability: Not on file       Family History   Problem Relation Age of Onset     Thyroid Cancer Mother      Hypertension Mother      Cerebrovascular Disease Mother      Cancer Father      Esophageal Cancer Father      Prostate Cancer Father      Hypertension Sister      Thyroid Disease Sister      Melanoma Brother      Hypertension Brother        Physical Examination:  Vital Signs:   blood pressure is 131/86 and her pulse is 80. Her respiration is 16 and oxygen saturation is 99%.     BOTULINUM NEUROTOXIN INJECTION PROCEDURES:    VERIFICATION OF PATIENT IDENTIFICATION AND PROCEDURE     Initials   Patient Name acc   Patient  acc   Procedure Verified by: [facility]     Above assessments performed by:  Priscilla Martínez MD      INDICATION/S FOR PROCEDURE/S:  Kassidy Guajardo is a 61 year old year old patient with dystonia affecting the  jaw muscles secondary to a diagnosis of oromandibular dystonia with associated  loss of volitional motor control.     Her baseline symptoms have been recalcitrant to oral medications and conservative therapy.  She is here today for an injection of Botox.      GOAL OF PROCEDURE:  The goal of this procedure is to improve volitional motor  control associated with dystonic movements.    TOTAL DOSE ADMINISTERED:  Dose Administered: 80 units Botox    Diluent Used:  Preservative Free Normal Saline  Total Volume of Diluent Used:  1 ml  NDC #: Botox 100u ([id number])    CONSENT:  The risks, benefits, and treatment options were discussed with Kassidy Guajardo and she agreed to proceed.      Written consent was obtained by acc.     EQUIPMENT USED:  Needle-37mm stimulating/recording  EMG/NCS Machine    SKIN PREPARATION:  Skin preparation was performed using an alcohol wipe.    GUIDANCE DESCRIPTION:  Electro-myographic guidance was necessary throughout the procedure to accurately identify all areas of dystonic muscles while avoiding injection of non-dystonic muscles, neighboring nerves and nearby vascular structures.     AREA/MUSCLE INJECTED:    Visual display of locations injections are scanned into the chart under MEDIA tab.    Muscles Injected Units Injected Number of Injections   Left lateral pterygoid 30 1   Left digastric, anterior belly 10 1   Right lateral pterygoid 30 1   Right digastric, anterior belly 10 1        Total Units Injected: 80    Unavoidable Waste: 20    Total Units Billed 100      The patient tolerated the injections well.  There was moderate swelling at the site of the left lateral pterygoid injection without pain or joint restriction.  Massage and ice were applied.      Assessment:    Kassidy Guajardo is a 61 year old female with jaw opening oromandibular dystonia.  Today we did repeat botulinum toxin injections.    Plan  Follow-up in 3 months' time to consider repeat injections.        Answers for HPI/ROS submitted by the patient on 1/17/2022  General Symptoms: No  Skin Symptoms: No  HENT Symptoms: No  EYE SYMPTOMS: No  HEART SYMPTOMS: No  LUNG SYMPTOMS: No  INTESTINAL SYMPTOMS: No  URINARY SYMPTOMS: No  GYNECOLOGIC SYMPTOMS: No  BREAST SYMPTOMS: No  SKELETAL SYMPTOMS: No  BLOOD SYMPTOMS: No  NERVOUS SYSTEM SYMPTOMS: No  MENTAL HEALTH  SYMPTOMS: No

## 2022-01-19 ENCOUNTER — OFFICE VISIT (OUTPATIENT)
Dept: NEUROLOGY | Facility: CLINIC | Age: 62
End: 2022-01-19
Payer: COMMERCIAL

## 2022-01-19 VITALS
SYSTOLIC BLOOD PRESSURE: 131 MMHG | RESPIRATION RATE: 16 BRPM | OXYGEN SATURATION: 99 % | DIASTOLIC BLOOD PRESSURE: 86 MMHG | HEART RATE: 80 BPM

## 2022-01-19 DIAGNOSIS — G24.4 OROMANDIBULAR DYSTONIA: Primary | ICD-10-CM

## 2022-01-19 PROCEDURE — 64612 DESTROY NERVE FACE MUSCLE: CPT | Mod: 50 | Performed by: STUDENT IN AN ORGANIZED HEALTH CARE EDUCATION/TRAINING PROGRAM

## 2022-01-19 PROCEDURE — 95874 GUIDE NERV DESTR NEEDLE EMG: CPT | Performed by: STUDENT IN AN ORGANIZED HEALTH CARE EDUCATION/TRAINING PROGRAM

## 2022-01-19 ASSESSMENT — PAIN SCALES - GENERAL: PAINLEVEL: MODERATE PAIN (5)

## 2022-02-01 DIAGNOSIS — K08.89 DIFFICULTY CHEWING: ICD-10-CM

## 2022-02-01 DIAGNOSIS — R13.11 ORAL PHASE DYSPHAGIA: ICD-10-CM

## 2022-02-01 DIAGNOSIS — G24.4 OROMANDIBULAR DYSTONIA: Primary | ICD-10-CM

## 2022-02-12 ENCOUNTER — HEALTH MAINTENANCE LETTER (OUTPATIENT)
Age: 62
End: 2022-02-12

## 2022-04-11 NOTE — PROGRESS NOTES
Movement Disorders Botulinum Toxin Clinic Note    Chief Complaint: Idiopathic oromandibular dystonia with jaw opening    History of Present Illness:  Kassidy Guajardo is a 61 year old female who presents to clinic for botulinum toxin injections for treatment of jaw opening oromandibular dystonia.  Still having trouble with chewing.  Sees PT on Friday.      Response to Last Injection: 1/19/2022    Onset of effect: 2 weeks    Benefit from last injections: no different for prior, improvement in jaw opening    Wearing off: yes, over the past 2-3 weeks    Side effects: no dysarthria or dysphagia, no weakness          Current Outpatient Medications   Medication Sig Dispense Refill     famotidine (PEPCID) 20 MG tablet Take 20 mg by mouth daily as needed        ibuprofen (ADVIL/MOTRIN) 200 MG tablet Take 200 mg by mouth every 4 hours as needed for mild pain         Allergies: She has No Known Allergies.    Past Medical History:   Diagnosis Date     CLL (chronic lymphocytic leukemia) (H)      Oromandibular dystonia 3/23/2020       Past Surgical History:   Procedure Laterality Date     COLONOSCOPY       GI SURGERY       HERNIA REPAIR Right     herniated intestine right       Social History     Socioeconomic History     Marital status:      Spouse name: Not on file     Number of children: Not on file     Years of education: Not on file     Highest education level: Not on file   Occupational History     Not on file   Tobacco Use     Smoking status: Never Smoker     Smokeless tobacco: Never Used   Substance and Sexual Activity     Alcohol use: Yes     Alcohol/week: 1.0 standard drink     Types: 1 Glasses of wine per week     Drug use: No     Sexual activity: Not on file   Other Topics Concern     Not on file   Social History Narrative     Not on file     Social Determinants of Health     Financial Resource Strain: Not on file   Food Insecurity: Not on file   Transportation Needs: Not on file   Physical Activity: Not on file    Stress: Not on file   Social Connections: Not on file   Intimate Partner Violence: Not on file   Housing Stability: Not on file       Family History   Problem Relation Age of Onset     Thyroid Cancer Mother      Hypertension Mother      Cerebrovascular Disease Mother      Cancer Father      Esophageal Cancer Father      Prostate Cancer Father      Hypertension Sister      Thyroid Disease Sister      Melanoma Brother      Hypertension Brother        Physical Examination:  Vital Signs:   blood pressure is 142/88 (abnormal) and her pulse is 78. Her respiration is 17 and oxygen saturation is 99%.       BOTULINUM NEUROTOXIN INJECTION PROCEDURES:  VERIFICATION OF PATIENT IDENTIFICATION AND PROCEDURE     Initials   Patient Name [facility]   Patient  [facility]   Procedure Verified by: [facility]     Above assessments performed by:  Priscilla Martínez MD      INDICATION/S FOR PROCEDURE/S:  Kassidy Guajardo is a 61 year old year old patient with dystonia affecting the  jaw muscles secondary to a diagnosis of oromandibular dystonia with associated  loss of volitional motor control.     Her baseline symptoms have been recalcitrant to oral medications and conservative therapy.  She is here today for an injection of Botox.      GOAL OF PROCEDURE:  The goal of this procedure is to improve volitional motor control associated with dystonic movements.    TOTAL DOSE ADMINISTERED:  Dose Administered: 80 units Botox    Diluent Used:  Preservative Free Normal Saline  Total Volume of Diluent Used: 1 ml  NDC #: Botox 100u ([id number])    CONSENT:  The risks, benefits, and treatment options were discussed with Kassidy Guajardo and she agreed to proceed.      Written consent was obtained by [facility].     EQUIPMENT USED:  Needle-37mm stimulating/recording  EMG/NCS Machine    SKIN PREPARATION:  Skin preparation was performed using an alcohol wipe.    GUIDANCE DESCRIPTION:  Electro-myographic guidance was necessary throughout the procedure to accurately identify  all areas of dystonic muscles while avoiding injection of non-dystonic muscles, neighboring nerves and nearby vascular structures.     AREA/MUSCLE INJECTED:    Visual display of locations injections are scanned into the chart under MEDIA tab.    Muscles Injected Units Injected Number of Injections   Left lateral pterygoid 30 (30) 1   Left digastric, anterior belly 10 (10) 1   Right lateral pterygoid 30 (30) 1   Right digastric, anterior belly 10 (10) 1        Total Units Injected: 80    Unavoidable Waste: 20    Total Units Billed 100      The patient tolerated the injections well.     Assessment:    Kassidy Guajardo is a 61 year old female with jaw opening oromandibular dystonia.  Today we did repeat botulinum toxin injections.    Plan  Follow-up in 3 months' time to consider repeat injections with Dr. Lauren Pierre.

## 2022-04-13 ENCOUNTER — OFFICE VISIT (OUTPATIENT)
Dept: NEUROLOGY | Facility: CLINIC | Age: 62
End: 2022-04-13
Payer: COMMERCIAL

## 2022-04-13 VITALS
SYSTOLIC BLOOD PRESSURE: 142 MMHG | OXYGEN SATURATION: 99 % | HEART RATE: 78 BPM | RESPIRATION RATE: 17 BRPM | DIASTOLIC BLOOD PRESSURE: 88 MMHG

## 2022-04-13 DIAGNOSIS — G24.4 OROMANDIBULAR DYSTONIA: Primary | ICD-10-CM

## 2022-04-13 PROCEDURE — 64612 DESTROY NERVE FACE MUSCLE: CPT | Mod: 50 | Performed by: PSYCHIATRY & NEUROLOGY

## 2022-04-13 PROCEDURE — 95874 GUIDE NERV DESTR NEEDLE EMG: CPT | Performed by: PSYCHIATRY & NEUROLOGY

## 2022-04-13 ASSESSMENT — PAIN SCALES - GENERAL: PAINLEVEL: NO PAIN (0)

## 2022-04-15 ENCOUNTER — THERAPY VISIT (OUTPATIENT)
Dept: SPEECH THERAPY | Facility: CLINIC | Age: 62
End: 2022-04-15
Payer: COMMERCIAL

## 2022-04-15 DIAGNOSIS — R13.11 ORAL PHASE DYSPHAGIA: ICD-10-CM

## 2022-04-15 DIAGNOSIS — G24.4 OROMANDIBULAR DYSTONIA: ICD-10-CM

## 2022-04-15 DIAGNOSIS — K08.89 DIFFICULTY CHEWING: ICD-10-CM

## 2022-04-15 PROCEDURE — 92610 EVALUATE SWALLOWING FUNCTION: CPT | Mod: GN | Performed by: SPEECH-LANGUAGE PATHOLOGIST

## 2022-05-11 NOTE — PROGRESS NOTES
Speech-Language Pathology Department   EVALUATION  [facility]  Clinical Swallow Evaluation    04/15/22 1100   General Information   Type Of Visit Initial   Start Of Care Date 04/15/22   Referring Physician Dr. Priscilla Martínez   Orders Evaluate And Treat   Orders Comment Clinical Swallow Evaluation   Medical Diagnosis Oromandibular dystonia s/p botox injection   Precautions/limitations Swallowing Precautions   Hearing Adequate in quiet setting   Pertinent History of Current Problem/OT: Additional Occupational Profile Info Kassidy Guajardo is a 61-year-old female who has idiopathic oromandibular dystonia with jaw opening. She has undergoing botulinum toxin injections for treatment of jaw opening oromandibular dystonia. Kassidy was seen for speech pathology in January 2021. Today she reports having undergone multiple botox injections since she was last seen. She has now switched to seeing a neurologist for these injections. She feels like they help but also there is more tension in her jaw. She continues to fight the dystonia in her jaw which causes fatigue and pain toward the end of the day.  Pursing her lips and holding that position is difficult and thus drinking from a straw is challenging.   Respiratory Status Room air   Prior Level Of Function Swallowing   Prior Level Of Function Comment Soft solids and thin liquids   Patient Role/employment History Employed   Living Environment House[facility]   Home/community Accessibility Comments (flowsheet Row) Independent   General Observations Pt highly pleasant and cooperative throughout evaluation   Patient/family Goals Pt would like to ensure adequate swallow function.   Clinical Swallow Evaluation   Oral Musculature generally intact  (dystonia present)   Dentition present and adequate   Mucosal Quality good   Mandibular Strength and Mobility impaired   Oral Labial Strength and Mobility impaired retraction   Lingual Strength  and Mobility WFL   Velar Elevation intact   Buccal Strength and Mobility impaired   Laryngeal Function Cough;Throat clear;Swallow;Voicing initiated   Clinical Swallow Eval: Thin Liquid Texture Trial   Mode of Presentation, Thin Liquids cup;self-fed;straw   Volume of Liquid or Food Presented 6 oz water   Oral Phase of Swallow WFL   Pharyngeal Phase of Swallow intact   Diagnostic Statement No overt clinical s/sx of aspiration/penetration. No anterior loss noted on cup sips of thin liquid. Pt took sips of liquid from the straw without difficulty demonstrated today.   Clinical Swallow Eval: Regular (Solid)   Mode of Presentation self-fed   Volume Presented [employer] cookie   Oral Phase Impaired mastication;Effortful AP movement   Pharyngeal Phase intact   Diagnostic Statement No overt clinical s/sx of aspiration/penetraiton noted on solid consistency trials.   Swallow Compensations   Swallow Compensations Reduce amounts   Educational Assessment   Barriers to Learning No barriers   Esophageal Phase of Swallow   Patient reports or presents with symptoms of esophageal dysphagia No   Swallow Eval: Clinical Impressions   Skilled Criteria for Therapy Intervention Current level of function same as previous level of function   Functional Assessment Scale (FAS) 5   Treatment Diagnosis Mild oropharyngeal dysphagia   Diet texture recommendations Easy to Chew diet (level 7);Thin liquids (level 0)   Recommended Feeding/Eating Techniques alternate between small bites and sips of food/liquid   Demonstrates Need for Referral to Another Service physical therapy  (for TMJ)   Predicted Duration of Therapy Intervention (days/wks) Evaluation only   Risks and Benefits of Treatment have been explained. Yes   Patient, family and/or staff in agreement with Plan of Care Yes   Clinical Impression Comments Kassidy Guajardo demonstrates mild oropharyngeal dysphagia. She is noted to have no overt clinical s/sx of aspiration/penetration on any  consistencies presented. Pt demonstrates decreased ability to bring her incisors together to bite through the cookie. She has developed strategies of breaking the cookie between her teeth or cutting her food into small bites, so she doesn't have to bite into it. She demonstrates decreased rotary motion of her jaw when chewing due to dystonic movements. She reports her  notes her tongue is doing different things when she is eating however it appears her tongue is functional although out of sync with her jaw since she has the dystonic movements. She demonstrated no anterior loss of bolus during the session however this is possible due to her dystonia. She is undergoing botox injections which will continue to be helpful. She is trying to hold her jaw still and fights against the jaw dystonia which might be causing her muscles to become more fatigued. Consider physical therapy for TMJ and dystonia. She will continue with soft moist solids and thin liquids. Sit upright for po intake. She will also work on decreasing stress which she reports increases her symptoms and fatigue. Consider stress management tactics including meditation, acupuncture, hypnosis, or talk therapy. She will follow up with her doctor. No further SLP services indicated after today's visit.   Total Session Time   SLP Eval: oral/pharyngeal swallow function, clinical minutes (30818) 45   Total Evaluation Time 45       Thank you for the referral of Kassidy Guajardo. If you have any questions about this report, please contact me using the information below.      Areli Silveira MS, CCC-SLP  Speech-Language Pathology  [facility] [place]  Department of Otolaryngology/D&T - 4th floor  Pager: [phone]  Phone: [phone]  Email: [email or web address]

## 2022-07-22 ENCOUNTER — OFFICE VISIT (OUTPATIENT)
Dept: NEUROLOGY | Facility: CLINIC | Age: 62
End: 2022-07-22
Payer: COMMERCIAL

## 2022-07-22 VITALS
SYSTOLIC BLOOD PRESSURE: 155 MMHG | DIASTOLIC BLOOD PRESSURE: 85 MMHG | HEART RATE: 69 BPM | RESPIRATION RATE: 16 BRPM | OXYGEN SATURATION: 98 %

## 2022-07-22 DIAGNOSIS — G24.4 OROMANDIBULAR DYSTONIA: Primary | ICD-10-CM

## 2022-07-22 DIAGNOSIS — R13.11 ORAL PHASE DYSPHAGIA: ICD-10-CM

## 2022-07-22 DIAGNOSIS — K08.89 DIFFICULTY CHEWING: ICD-10-CM

## 2022-07-22 PROCEDURE — 64612 DESTROY NERVE FACE MUSCLE: CPT | Mod: 50 | Performed by: PSYCHIATRY & NEUROLOGY

## 2022-07-22 PROCEDURE — 95874 GUIDE NERV DESTR NEEDLE EMG: CPT | Performed by: PSYCHIATRY & NEUROLOGY

## 2022-07-22 PROCEDURE — 99207 PR SATISFY VISIT NUMBER: CPT | Performed by: PSYCHIATRY & NEUROLOGY

## 2022-07-22 NOTE — LETTER
7/22/2022       RE: Kassidy Guajardo  [address] [address] [facility] [id number]     Dear Colleague,    Thank you for referring your patient, Kassidy Guajardo, to the [facility] [place] [facility]. Please see a copy of my visit note below.    Movement Disorders Botulinum Toxin Clinic Note    History of Present Illness:  Kassidy Guajardo is a 62 year old female who presents to clinic for botulinum toxin injections for treatment of Idiopathic oromandibular dystonia with jaw opening.     Response to Last Injection: 4/13/22 Dr. Martínez     Onset of effect:  2 weeks    Benefit from last injections:  Reported 70% improvement in jaw opening dystonia but no different from prior injections.     Wearing off: She noticed wearing off gradually about a month ago.     Side effects: She reports none. But has a hard time moving food around in mouth.   no dysarthria or dysphagia or trouble chewing. No weakness however describes that she can bite down about 80% and has a hard time for the remainder.    Additional interval history: none    Patient denies new medical diagnoses, illnesses, hospitalizations, emergency room visits, and injuries since the previous injection with botulinum neurotoxin.    ROS:  Other than that mentioned above, the remainder of 12 systems reviewed were negative.    Current Outpatient Medications   Medication Sig Dispense Refill     famotidine (PEPCID) 20 MG tablet Take 20 mg by mouth daily as needed        ibuprofen (ADVIL/MOTRIN) 200 MG tablet Take 200 mg by mouth every 4 hours as needed for mild pain         Allergies: She has No Known Allergies.    Past Medical History:   Diagnosis Date     CLL (chronic lymphocytic leukemia) (H)      Oromandibular dystonia 3/23/2020       Past Surgical History:   Procedure Laterality Date     COLONOSCOPY       GI SURGERY       HERNIA REPAIR Right     herniated intestine right       Social History      Socioeconomic History     Marital status:      Spouse name: Not on file     Number of children: Not on file     Years of education: Not on file     Highest education level: Not on file   Occupational History     Not on file   Tobacco Use     Smoking status: Never Smoker     Smokeless tobacco: Never Used   Substance and Sexual Activity     Alcohol use: Yes     Alcohol/week: 1.0 standard drink     Types: 1 Glasses of wine per week     Drug use: No     Sexual activity: Not on file   Other Topics Concern     Not on file   Social History Narrative     Not on file     Social Determinants of Health     Financial Resource Strain: Not on file   Food Insecurity: Not on file   Transportation Needs: Not on file   Physical Activity: Not on file   Stress: Not on file   Social Connections: Not on file   Intimate Partner Violence: Not on file   Housing Stability: Not on file       Family History   Problem Relation Age of Onset     Thyroid Cancer Mother      Hypertension Mother      Cerebrovascular Disease Mother      Cancer Father      Esophageal Cancer Father      Prostate Cancer Father      Hypertension Sister      Thyroid Disease Sister      Melanoma Brother      Hypertension Brother        Physical Examination:  Vital Signs:   vitals were not taken for this visit.   Difficulty closing jaw completely     BOTULINUM NEUROTOXIN INJECTION PROCEDURES:    VERIFICATION OF PATIENT IDENTIFICATION AND PROCEDURE     Initials   Patient Name kd   Patient  kd   Procedure Verified by: merlin     Prior to the start of the procedure and with procedural staff participation, I verbally confirmed the patient s identity using two indicators, relevant allergies, that the procedure was appropriate and matched the consent or emergent situation, and that the correct equipment/implants were available. Immediately prior to starting the procedure I conducted the Time Out with the procedural staff and re-confirmed the patient s name, procedure,  and site/side. (The Joint Commission universal protocol was followed.)  Yes    Sedation (Moderate or Deep): None      Above assessments performed by:    Lauren Pierre DO      INDICATION/S FOR PROCEDURE/S:  Kassidy Guajardo is a 62 year old year old patient with  dystonia affecting the  jaw muscles secondary to a diagnosis of oromandibular dystonia with associated  loss of volitional motor control.      Her baseline symptoms have been recalcitrant to oral medications and conservative therapy.  She is here today for an injection of Botox.       GOAL OF PROCEDURE:  The goal of this procedure is to improve volitional motor control associated with dystonic movements.    TOTAL DOSE ADMINISTERED:  Dose Administered:  90 units Botox    Diluent Used:  Preservative Free Normal Saline  Total Volume of Diluent Used:  1 mL/100 units    CONSENT:  The risks, benefits, and treatment options were discussed with Kassidy Guajardo and she agreed to proceed.      Written consent was obtained by kd.     EQUIPMENT USED:  Needle-37mm stimulating/recording  EMG/NCS Machine     SKIN PREPARATION:  Skin preparation was performed using an alcohol wipe.     GUIDANCE DESCRIPTION:  Electro-myographic guidance was necessary throughout the procedure to accurately identify all areas of dystonic muscles while avoiding injection of non-dystonic muscles, neighboring nerves and nearby vascular structures.      AREA/MUSCLE INJECTED:      Muscles Injected Units Injected Number of Injections   Left lateral pterygoid 35 (30) 1   Left digastric, anterior belly 10 (10) 1        Right lateral pterygoid 35 (30) 1   Right digastric, anterior belly 10 (10) 1           Total Units Injected: 90     Unavoidable Waste: 20     Total Units Billed 100      ( ) = previous dose  Bold = changes made this visit    The patient tolerated the injections without difficulty.      Assessment:    Kassidy Guajardo is a 62 year old female with Idiopathic oromandibular dystonia with  jaw opening.  Today we did repeat botulinum toxin injections.    Plan  Message via [employer] or take a video of movements in 3 weeks for an update  Avoid heat and cold pack and massaging the areas injected for 24 hours post injections  Follow-up in 3 months' time to consider repeat injections       Lauren Pierre DO   of Neurology   [facility]

## 2022-07-22 NOTE — PATIENT INSTRUCTIONS
Plan  Message via [employer] or take a video of movements in 3 weeks for an update  Avoid heat and cold pack and massaging the areas injected for 24 hours post injections  Follow-up in 3 months' time to consider repeat injections

## 2022-07-22 NOTE — PROGRESS NOTES
Movement Disorders Botulinum Toxin Clinic Note    History of Present Illness:  Kassidy Guajardo is a 62 year old female who presents to clinic for botulinum toxin injections for treatment of Idiopathic oromandibular dystonia with jaw opening.     Response to Last Injection: 4/13/22 Dr. Martínez     Onset of effect:  2 weeks    Benefit from last injections:  Reported 70% improvement in jaw opening dystonia but no different from prior injections.     Wearing off: She noticed wearing off gradually about a month ago.     Side effects: She reports none. But has a hard time moving food around in mouth.   no dysarthria or dysphagia or trouble chewing. No weakness however describes that she can bite down about 80% and has a hard time for the remainder.    Additional interval history: none    Patient denies new medical diagnoses, illnesses, hospitalizations, emergency room visits, and injuries since the previous injection with botulinum neurotoxin.    ROS:  Other than that mentioned above, the remainder of 12 systems reviewed were negative.    Current Outpatient Medications   Medication Sig Dispense Refill     famotidine (PEPCID) 20 MG tablet Take 20 mg by mouth daily as needed        ibuprofen (ADVIL/MOTRIN) 200 MG tablet Take 200 mg by mouth every 4 hours as needed for mild pain         Allergies: She has No Known Allergies.    Past Medical History:   Diagnosis Date     CLL (chronic lymphocytic leukemia) (H)      Oromandibular dystonia 3/23/2020       Past Surgical History:   Procedure Laterality Date     COLONOSCOPY       GI SURGERY       HERNIA REPAIR Right     herniated intestine right       Social History     Socioeconomic History     Marital status:      Spouse name: Not on file     Number of children: Not on file     Years of education: Not on file     Highest education level: Not on file   Occupational History     Not on file   Tobacco Use     Smoking status: Never Smoker     Smokeless tobacco: Never Used    Substance and Sexual Activity     Alcohol use: Yes     Alcohol/week: 1.0 standard drink     Types: 1 Glasses of wine per week     Drug use: No     Sexual activity: Not on file   Other Topics Concern     Not on file   Social History Narrative     Not on file     Social Determinants of Health     Financial Resource Strain: Not on file   Food Insecurity: Not on file   Transportation Needs: Not on file   Physical Activity: Not on file   Stress: Not on file   Social Connections: Not on file   Intimate Partner Violence: Not on file   Housing Stability: Not on file       Family History   Problem Relation Age of Onset     Thyroid Cancer Mother      Hypertension Mother      Cerebrovascular Disease Mother      Cancer Father      Esophageal Cancer Father      Prostate Cancer Father      Hypertension Sister      Thyroid Disease Sister      Melanoma Brother      Hypertension Brother        Physical Examination:  Vital Signs:   vitals were not taken for this visit.   Difficulty closing jaw completely     BOTULINUM NEUROTOXIN INJECTION PROCEDURES:    VERIFICATION OF PATIENT IDENTIFICATION AND PROCEDURE     Initials   Patient Name kd   Patient  kd   Procedure Verified by: merlin     Prior to the start of the procedure and with procedural staff participation, I verbally confirmed the patient s identity using two indicators, relevant allergies, that the procedure was appropriate and matched the consent or emergent situation, and that the correct equipment/implants were available. Immediately prior to starting the procedure I conducted the Time Out with the procedural staff and re-confirmed the patient s name, procedure, and site/side. (The Joint Commission universal protocol was followed.)  Yes    Sedation (Moderate or Deep): None      Above assessments performed by:    Lauren Pierre DO      INDICATION/S FOR PROCEDURE/S:  Kassidy Guajardo is a 62 year old year old patient with  dystonia affecting the  jaw muscles secondary to  a diagnosis of oromandibular dystonia with associated  loss of volitional motor control.      Her baseline symptoms have been recalcitrant to oral medications and conservative therapy.  She is here today for an injection of Botox.       GOAL OF PROCEDURE:  The goal of this procedure is to improve volitional motor control associated with dystonic movements.    TOTAL DOSE ADMINISTERED:  Dose Administered:  90 units Botox    Diluent Used:  Preservative Free Normal Saline  Total Volume of Diluent Used:  1 mL/100 units    CONSENT:  The risks, benefits, and treatment options were discussed with Kassidy Guajardo and she agreed to proceed.      Written consent was obtained by kd.     EQUIPMENT USED:  Needle-37mm stimulating/recording  EMG/NCS Machine     SKIN PREPARATION:  Skin preparation was performed using an alcohol wipe.     GUIDANCE DESCRIPTION:  Electro-myographic guidance was necessary throughout the procedure to accurately identify all areas of dystonic muscles while avoiding injection of non-dystonic muscles, neighboring nerves and nearby vascular structures.      AREA/MUSCLE INJECTED:      Muscles Injected Units Injected Number of Injections   Left lateral pterygoid 35 (30) 1   Left digastric, anterior belly 10 (10) 1        Right lateral pterygoid 35 (30) 1   Right digastric, anterior belly 10 (10) 1           Total Units Injected: 90     Unavoidable Waste: 10     Total Units Billed 100      ( ) = previous dose  Bold = changes made this visit    The patient tolerated the injections without difficulty.      Assessment:    Kassidy Guajardo is a 62 year old female with Idiopathic oromandibular dystonia with jaw opening.  Today we did repeat botulinum toxin injections.    Plan  Message via [employer] or take a video of movements in 3 weeks for an update  Avoid heat and cold pack and massaging the areas injected for 24 hours post injections  Follow-up in 3 months' time to consider repeat injections     Lauren Pierre,     of Neurology   [facility]

## 2022-07-22 NOTE — NURSING NOTE
Chief Complaint   Patient presents with     Oromandibular Dystonia     botox       Jeramy Wolf, EMT

## 2022-10-10 ENCOUNTER — HEALTH MAINTENANCE LETTER (OUTPATIENT)
Age: 62
End: 2022-10-10

## 2022-10-20 NOTE — PROGRESS NOTES
"Movement Disorders Botulinum Toxin Clinic Note    History of Present Illness:  Kassidy Guajardo is a 62 year old female who presents to clinic for botulinum toxin injections for treatment of Idiopathic oromandibular dystonia with jaw opening.     Response to Last Injection: 7/22/22    Onset of effect after injections:  \"Within 2 weeks\".    Benefit from last injections:  Reported improvement in jaw opening/closing, hard to gauge the improvement, maybe 50%.    Wearing off: She noticed wearing off 2 weeks ago but this has been subtle.    Side effects: She reports having a hard time maneuvering her tongue which is likely not related to the botulinum toxin injections.    Additional interval history: She has tried hypnosis in the past with good benefit.     Patient denies new medical diagnoses, illnesses, hospitalizations, emergency room visits, and injuries since the previous injection with botulinum neurotoxin.    ROS:  Other than that mentioned above, the remainder of 12 systems reviewed were negative.    Current Outpatient Medications   Medication Sig Dispense Refill     famotidine (PEPCID) 20 MG tablet Take 20 mg by mouth daily as needed        ibuprofen (ADVIL/MOTRIN) 200 MG tablet Take 200 mg by mouth every 4 hours as needed for mild pain         Allergies: She has No Known Allergies.    Past Medical History:   Diagnosis Date     CLL (chronic lymphocytic leukemia) (H)      Oromandibular dystonia 3/23/2020       Past Surgical History:   Procedure Laterality Date     COLONOSCOPY       GI SURGERY       HERNIA REPAIR Right     herniated intestine right       Social History     Socioeconomic History     Marital status:      Spouse name: Not on file     Number of children: Not on file     Years of education: Not on file     Highest education level: Not on file   Occupational History     Not on file   Tobacco Use     Smoking status: Never     Smokeless tobacco: Never   Substance and Sexual Activity     Alcohol use: " Yes     Alcohol/week: 1.0 standard drink     Types: 1 Glasses of wine per week     Drug use: No     Sexual activity: Not on file   Other Topics Concern     Not on file   Social History Narrative     Not on file     Social Determinants of Health     Financial Resource Strain: Not on file   Food Insecurity: Not on file   Transportation Needs: Not on file   Physical Activity: Not on file   Stress: Not on file   Social Connections: Not on file   Intimate Partner Violence: Not on file   Housing Stability: Not on file       Family History   Problem Relation Age of Onset     Thyroid Cancer Mother      Hypertension Mother      Cerebrovascular Disease Mother      Cancer Father      Esophageal Cancer Father      Prostate Cancer Father      Hypertension Sister      Thyroid Disease Sister      Melanoma Brother      Hypertension Brother        Physical Examination:  Vital Signs:   vitals were not taken for this visit.   Difficulty closing jaw completely     BOTULINUM NEUROTOXIN INJECTION PROCEDURES:    VERIFICATION OF PATIENT IDENTIFICATION AND PROCEDURE     Initials   Patient Name  kd   Patient  kd   Procedure Verified by: merlin     Prior to the start of the procedure and with procedural staff participation, I verbally confirmed the patient s identity using two indicators, relevant allergies, that the procedure was appropriate and matched the consent or emergent situation, and that the correct equipment/implants were available. Immediately prior to starting the procedure I conducted the Time Out with the procedural staff and re-confirmed the patient s name, procedure, and site/side. (The Joint Commission universal protocol was followed.)  Yes    Sedation (Moderate or Deep): None      Above assessments performed by:  Lauren Pierre DO      INDICATION/S FOR PROCEDURE/S:  Kassidy Guajardo is a 62 year old year old patient with dystonia affecting the  jaw muscles secondary to a diagnosis of oromandibular dystonia with  associated  loss of volitional motor control.      Her baseline symptoms have been recalcitrant to oral medications and conservative therapy.  She is here today for an injection of Botox.       GOAL OF PROCEDURE:  The goal of this procedure is to improve volitional motor control associated with dystonic movements.    TOTAL DOSE ADMINISTERED:  Dose Administered:  80 units Botox    Diluent Used:  Preservative Free Normal Saline  Total Volume of Diluent Used:  1 mL/100 units    CONSENT:  The risks, benefits, and treatment options were discussed with Kassidy Guajardo and she agreed to proceed.      Written consent was obtained by kd.     EQUIPMENT USED:  Needle-37mm stimulating/recording  EMG/NCS Machine     SKIN PREPARATION:  Skin preparation was performed using an alcohol wipe.     GUIDANCE DESCRIPTION:  Electro-myographic guidance was necessary throughout the procedure to accurately identify all areas of dystonic muscles while avoiding injection of non-dystonic muscles, neighboring nerves and nearby vascular structures.      AREA/MUSCLE INJECTED:       Muscles Injected Units Injected Number of Injections   Left lateral pterygoid 30 (35) 1   Left digastric, anterior belly 10 (10) 1           Right lateral pterygoid 30 (35) 1   Right digastric, anterior belly 10 (10) 1           Total Units Injected: 80     Unavoidable Waste: 20     Total Units Billed 100     ( ) = previous dose  Bold = changes made this visit    The patient tolerated the injections without difficulty.    Assessment:    Kassidy Guajardo is a 62 year old female with Idiopathic oromandibular dystonia with jaw opening.  Today we did repeat botulinum toxin injections.    Plan  Message via [employer] or take a video of movements in 3 weeks for an update  Avoid heat and cold pack and massaging the areas injected for 24 hours post injections  Follow-up in 3 months' time to consider repeat injections    Lauren Pierre DO   of Neurology    [facility]

## 2022-10-21 ENCOUNTER — OFFICE VISIT (OUTPATIENT)
Dept: NEUROLOGY | Facility: CLINIC | Age: 62
End: 2022-10-21
Payer: COMMERCIAL

## 2022-10-21 VITALS
WEIGHT: 164.7 LBS | SYSTOLIC BLOOD PRESSURE: 147 MMHG | DIASTOLIC BLOOD PRESSURE: 89 MMHG | BODY MASS INDEX: 25.8 KG/M2 | HEART RATE: 70 BPM | OXYGEN SATURATION: 99 %

## 2022-10-21 DIAGNOSIS — G24.4 OROMANDIBULAR DYSTONIA: Primary | ICD-10-CM

## 2022-10-21 DIAGNOSIS — K08.89 DIFFICULTY CHEWING: ICD-10-CM

## 2022-10-21 PROCEDURE — 95874 GUIDE NERV DESTR NEEDLE EMG: CPT | Performed by: PSYCHIATRY & NEUROLOGY

## 2022-10-21 PROCEDURE — 99207 PR SATISFY VISIT NUMBER: CPT | Performed by: PSYCHIATRY & NEUROLOGY

## 2022-10-21 PROCEDURE — 64612 DESTROY NERVE FACE MUSCLE: CPT | Mod: 50 | Performed by: PSYCHIATRY & NEUROLOGY

## 2022-10-21 NOTE — LETTER
"10/21/2022       RE: Kassidy Guajardo  [address] [facility] [address]     Dear Colleague,    Thank you for referring your patient, Kassidy Guajardo, to the [facility] NEUROLOGY CLIN[facility] [place] at [facility]. Please see a copy of my visit note below.    Movement Disorders Botulinum Toxin Clinic Note    History of Present Illness:  Kassidy Guajardo is a 62 year old female who presents to clinic for botulinum toxin injections for treatment of Idiopathic oromandibular dystonia with jaw opening.     Response to Last Injection: 7/22/22    Onset of effect after injections:  \"Within 2 weeks\".    Benefit from last injections:  Reported improvement in jaw opening/closing, hard to gauge the improvement, maybe 50%.    Wearing off: She noticed wearing off 2 weeks ago but this has been subtle.    Side effects: She reports having a hard time maneuvering her tongue which is likely not related to the botulinum toxin injections.    Additional interval history: She has tried hypnosis in the past with good benefit.     Patient denies new medical diagnoses, illnesses, hospitalizations, emergency room visits, and injuries since the previous injection with botulinum neurotoxin.    ROS:  Other than that mentioned above, the remainder of 12 systems reviewed were negative.    Current Outpatient Medications   Medication Sig Dispense Refill     famotidine (PEPCID) 20 MG tablet Take 20 mg by mouth daily as needed        ibuprofen (ADVIL/MOTRIN) 200 MG tablet Take 200 mg by mouth every 4 hours as needed for mild pain         Allergies: She has No Known Allergies.    Past Medical History:   Diagnosis Date     CLL (chronic lymphocytic leukemia) (H)      Oromandibular dystonia 3/23/2020       Past Surgical History:   Procedure Laterality Date     COLONOSCOPY       GI SURGERY       HERNIA REPAIR Right     herniated intestine right       Social History     Socioeconomic History     " Marital status:      Spouse name: Not on file     Number of children: Not on file     Years of education: Not on file     Highest education level: Not on file   Occupational History     Not on file   Tobacco Use     Smoking status: Never     Smokeless tobacco: Never   Substance and Sexual Activity     Alcohol use: Yes     Alcohol/week: 1.0 standard drink     Types: 1 Glasses of wine per week     Drug use: No     Sexual activity: Not on file   Other Topics Concern     Not on file   Social History Narrative     Not on file     Social Determinants of Health     Financial Resource Strain: Not on file   Food Insecurity: Not on file   Transportation Needs: Not on file   Physical Activity: Not on file   Stress: Not on file   Social Connections: Not on file   Intimate Partner Violence: Not on file   Housing Stability: Not on file       Family History   Problem Relation Age of Onset     Thyroid Cancer Mother      Hypertension Mother      Cerebrovascular Disease Mother      Cancer Father      Esophageal Cancer Father      Prostate Cancer Father      Hypertension Sister      Thyroid Disease Sister      Melanoma Brother      Hypertension Brother        Physical Examination:  Vital Signs:   vitals were not taken for this visit.   Difficulty closing jaw completely     BOTULINUM NEUROTOXIN INJECTION PROCEDURES:    VERIFICATION OF PATIENT IDENTIFICATION AND PROCEDURE     Initials   Patient Name  kd   Patient  kd   Procedure Verified by: merlin     Prior to the start of the procedure and with procedural staff participation, I verbally confirmed the patient s identity using two indicators, relevant allergies, that the procedure was appropriate and matched the consent or emergent situation, and that the correct equipment/implants were available. Immediately prior to starting the procedure I conducted the Time Out with the procedural staff and re-confirmed the patient s name, procedure, and site/side. (The Joint Commission  universal protocol was followed.)  Yes    Sedation (Moderate or Deep): None      Above assessments performed by:  Lauren Pierre DO      INDICATION/S FOR PROCEDURE/S:  Kassidy Guajardo is a 62 year old year old patient with dystonia affecting the  jaw muscles secondary to a diagnosis of oromandibular dystonia with associated  loss of volitional motor control.      Her baseline symptoms have been recalcitrant to oral medications and conservative therapy.  She is here today for an injection of Botox.       GOAL OF PROCEDURE:  The goal of this procedure is to improve volitional motor control associated with dystonic movements.    TOTAL DOSE ADMINISTERED:  Dose Administered:  80 units Botox    Diluent Used:  Preservative Free Normal Saline  Total Volume of Diluent Used:  1 mL/100 units    CONSENT:  The risks, benefits, and treatment options were discussed with Kassidy Guajardo and she agreed to proceed.      Written consent was obtained by kd.     EQUIPMENT USED:  Needle-37mm stimulating/recording  EMG/NCS Machine     SKIN PREPARATION:  Skin preparation was performed using an alcohol wipe.     GUIDANCE DESCRIPTION:  Electro-myographic guidance was necessary throughout the procedure to accurately identify all areas of dystonic muscles while avoiding injection of non-dystonic muscles, neighboring nerves and nearby vascular structures.      AREA/MUSCLE INJECTED:       Muscles Injected Units Injected Number of Injections   Left lateral pterygoid 30 (35) 1   Left digastric, anterior belly 10 (10) 1           Right lateral pterygoid 30 (35) 1   Right digastric, anterior belly 10 (10) 1           Total Units Injected: 80     Unavoidable Waste: 20     Total Units Billed 100     ( ) = previous dose  Bold = changes made this visit    The patient tolerated the injections without difficulty.    Assessment:    Kassidy Guajardo is a 62 year old female with Idiopathic oromandibular dystonia with jaw opening.  Today we did repeat  botulinum toxin injections.    Plan  Message via [employer] or take a video of movements in 3 weeks for an update  Avoid heat and cold pack and massaging the areas injected for 24 hours post injections  Follow-up in 3 months' time to consider repeat injections    Lauren Pierre DO   of Neurology   [facility]

## 2022-10-21 NOTE — NURSING NOTE
Chief Complaint   Patient presents with     RECHECK     Botox injections confirmed with patient   Per patient wants to possibly do a little less botox today.     Xuan Benson CMA at 7:02 AM on 10/21/2022.

## 2022-12-22 DIAGNOSIS — G24.4 OROMANDIBULAR DYSTONIA: Primary | ICD-10-CM

## 2023-03-25 ENCOUNTER — HEALTH MAINTENANCE LETTER (OUTPATIENT)
Age: 63
End: 2023-03-25

## 2023-04-27 ENCOUNTER — APPOINTMENT (OUTPATIENT)
Dept: URBAN - METROPOLITAN AREA CLINIC 253 | Age: 63
Setting detail: DERMATOLOGY
End: 2023-05-01

## 2023-04-27 VITALS — WEIGHT: 165 LBS | RESPIRATION RATE: 14 BRPM | HEIGHT: 67.5 IN

## 2023-04-27 DIAGNOSIS — L84 CORNS AND CALLOSITIES: ICD-10-CM

## 2023-04-27 DIAGNOSIS — L81.4 OTHER MELANIN HYPERPIGMENTATION: ICD-10-CM

## 2023-04-27 DIAGNOSIS — D22 MELANOCYTIC NEVI: ICD-10-CM

## 2023-04-27 DIAGNOSIS — L82.1 OTHER SEBORRHEIC KERATOSIS: ICD-10-CM

## 2023-04-27 DIAGNOSIS — D18.0 HEMANGIOMA: ICD-10-CM

## 2023-04-27 DIAGNOSIS — Z71.89 OTHER SPECIFIED COUNSELING: ICD-10-CM

## 2023-04-27 DIAGNOSIS — D49.2 NEOPLASM OF UNSPECIFIED BEHAVIOR OF BONE, SOFT TISSUE, AND SKIN: ICD-10-CM

## 2023-04-27 PROBLEM — D18.01 HEMANGIOMA OF SKIN AND SUBCUTANEOUS TISSUE: Status: ACTIVE | Noted: 2023-04-27

## 2023-04-27 PROBLEM — D22.39 MELANOCYTIC NEVI OF OTHER PARTS OF FACE: Status: ACTIVE | Noted: 2023-04-27

## 2023-04-27 PROBLEM — D22.4 MELANOCYTIC NEVI OF SCALP AND NECK: Status: ACTIVE | Noted: 2023-04-27

## 2023-04-27 PROBLEM — D22.5 MELANOCYTIC NEVI OF TRUNK: Status: ACTIVE | Noted: 2023-04-27

## 2023-04-27 PROBLEM — D22.72 MELANOCYTIC NEVI OF LEFT LOWER LIMB, INCLUDING HIP: Status: ACTIVE | Noted: 2023-04-27

## 2023-04-27 PROCEDURE — OTHER COUNSELING: OTHER

## 2023-04-27 PROCEDURE — OTHER BIOPSY BY SHAVE METHOD: OTHER

## 2023-04-27 PROCEDURE — OTHER MIPS QUALITY: OTHER

## 2023-04-27 PROCEDURE — 11102 TANGNTL BX SKIN SINGLE LES: CPT | Mod: 59

## 2023-04-27 PROCEDURE — OTHER BENIGN DESTRUCTION: OTHER

## 2023-04-27 PROCEDURE — 17110 DESTRUCT B9 LESION 1-14: CPT

## 2023-04-27 PROCEDURE — 99203 OFFICE O/P NEW LOW 30 MIN: CPT | Mod: 25

## 2023-04-27 PROCEDURE — OTHER ADDITIONAL NOTES: OTHER

## 2023-04-27 ASSESSMENT — LOCATION ZONE DERM
LOCATION ZONE: TRUNK
LOCATION ZONE: LEG
LOCATION ZONE: FEET
LOCATION ZONE: FACE
LOCATION ZONE: NECK

## 2023-04-27 ASSESSMENT — LOCATION SIMPLE DESCRIPTION DERM
LOCATION SIMPLE: RIGHT UPPER BACK
LOCATION SIMPLE: LEFT BUTTOCK
LOCATION SIMPLE: UPPER BACK
LOCATION SIMPLE: LEFT CALF
LOCATION SIMPLE: RIGHT CHEEK
LOCATION SIMPLE: RIGHT PLANTAR SURFACE
LOCATION SIMPLE: LEFT TEMPLE
LOCATION SIMPLE: LEFT ANTERIOR NECK

## 2023-04-27 ASSESSMENT — LOCATION DETAILED DESCRIPTION DERM
LOCATION DETAILED: LEFT INFERIOR LATERAL NECK
LOCATION DETAILED: LEFT BUTTOCK
LOCATION DETAILED: RIGHT PLANTAR FOREFOOT OVERLYING 2ND METATARSAL
LOCATION DETAILED: RIGHT INFERIOR MEDIAL MALAR CHEEK
LOCATION DETAILED: RIGHT INFERIOR UPPER BACK
LOCATION DETAILED: LEFT SUPERIOR ANTERIOR NECK
LOCATION DETAILED: LEFT MID TEMPLE
LOCATION DETAILED: LEFT PROXIMAL CALF
LOCATION DETAILED: INFERIOR THORACIC SPINE

## 2023-04-27 NOTE — PROCEDURE: ADDITIONAL NOTES
Detail Level: Zone
Render Risk Assessment In Note?: no
Additional Notes: Pricing sheet given for cosmetic removal of these benign moles.
Additional Notes: Advised patient to soak the foot and use a pumice stone to help remove dead skin. Recommended patient get insoles and/or corn pads to reduce discomfort.

## 2023-04-27 NOTE — PROCEDURE: BIOPSY BY SHAVE METHOD
Depth Of Biopsy: dermis
Hide Additional Anticipated Plan?: No
Additional Anesthesia Volume In Cc (Will Not Render If 0): 0
Dressing: bandage
Render Post-Care Instructions In Note?: yes
Curettage Text: The wound bed was treated with curettage after the biopsy was performed.
Electrodesiccation Text: The wound bed was treated with electrodesiccation after the biopsy was performed.
Hemostasis: Drysol
Consent: Written consent was obtained and risks were reviewed including but not limited to scarring, infection, bleeding, scabbing, incomplete removal, nerve damage and allergy to anesthesia.
Billing Type: Third-Party Bill
Silver Nitrate Text: The wound bed was treated with silver nitrate after the biopsy was performed.
Detail Level: Detailed
Anesthesia Type: 2% lidocaine with epinephrine and a 1:10 solution of 8.4% sodium bicarbonate
Biopsy Type: H and E
Post-Care Instructions: I reviewed with the patient in detail post-care instructions. Patient is to keep the biopsy site dry overnight, and then apply bacitracin twice daily until healed. Patient may apply hydrogen peroxide soaks to remove any crusting.
Cryotherapy Text: The wound bed was treated with cryotherapy after the biopsy was performed.
Notification Instructions: Patient will be notified of biopsy results. However, patient instructed to call the office if not contacted within 2 weeks.
Biopsy Method: Dermablade
Anesthesia Volume In Cc (Will Not Render If 0): 0.2
Information: Selecting Yes will display possible errors in your note based on the variables you have selected. This validation is only offered as a suggestion for you. PLEASE NOTE THAT THE VALIDATION TEXT WILL BE REMOVED WHEN YOU FINALIZE YOUR NOTE. IF YOU WANT TO FAX A PRELIMINARY NOTE YOU WILL NEED TO TOGGLE THIS TO 'NO' IF YOU DO NOT WANT IT IN YOUR FAXED NOTE.
Electrodesiccation And Curettage Text: The wound bed was treated with electrodesiccation and curettage after the biopsy was performed.
Type Of Destruction Used: Curettage
Wound Care: Petrolatum

## 2023-04-27 NOTE — HPI: FULL BODY SKIN EXAMINATION
What Type Of Note Output Would You Prefer (Optional)?: Bullet Format
What Is The Reason For Today's Visit?: Full Body Skin Examination
What Is The Reason For Today's Visit? (Being Monitored For X): concerning skin lesions on an annual basis
Additional History: Patient has two bumps on the right side of the nose that have been there about 2 weeks. \\nPatient has a bump above the left ear that has been there for decades that zings about once every 3 months. \\nPatient has two tag like lesions on the left side of the neck. \\nPatient has an embedded clump that gets sore on the bottom of the right foot.\\n\\nShe uses tanning beds in February prior to summer.

## 2023-04-27 NOTE — PROCEDURE: BENIGN DESTRUCTION
Treatment Number (Will Not Render If 0): 0
Medical Necessity Clause: This procedure was medically necessary because the lesions that were treated were:
Include Z78.9 (Other Specified Conditions Influencing Health Status) As An Associated Diagnosis?: No
Render Post-Care Instructions In Note?: yes
Medical Necessity Information: It is in your best interest to select a reason for this procedure from the list below. All of these items fulfill various CMS LCD requirements except the new and changing color options.
Post-Care Instructions: I reviewed with the patient in detail post-care instructions. Patient is to wear sunprotection, and avoid picking at any of the treated lesions. Pt may apply Vaseline to crusted or scabbing areas.
Detail Level: Detailed
Consent: The patient's consent was obtained including but not limited to risks of crusting, scabbing, blistering, scarring, darker or lighter pigmentary change, recurrence, incomplete removal and infection.

## 2023-05-12 ENCOUNTER — OFFICE VISIT (OUTPATIENT)
Dept: NEUROLOGY | Facility: CLINIC | Age: 63
End: 2023-05-12
Payer: COMMERCIAL

## 2023-05-12 DIAGNOSIS — K08.89 DIFFICULTY CHEWING: ICD-10-CM

## 2023-05-12 DIAGNOSIS — G24.4 OROMANDIBULAR DYSTONIA: Primary | ICD-10-CM

## 2023-05-12 PROCEDURE — 64612 DESTROY NERVE FACE MUSCLE: CPT | Mod: 50 | Performed by: PSYCHIATRY & NEUROLOGY

## 2023-05-12 PROCEDURE — 95874 GUIDE NERV DESTR NEEDLE EMG: CPT | Performed by: PSYCHIATRY & NEUROLOGY

## 2023-05-12 NOTE — LETTER
5/12/2023       RE: Kassidy Guajardo  [address] [facility] [address]       Dear Colleague,    Thank you for referring your patient, Kassidy Guajardo, to the [facility] NEUROLOGY CLINIC [place] at [facility]. Please see a copy of my visit note below.    Movement Disorders Botulinum Toxin Clinic Note    History of Present Illness:  Kassidy Guajardo is a 62 year old female who presents to clinic for botulinum toxin injections for treatment of Idiopathic oromandibular dystonia with jaw opening.     Date of last injection: 10/21/22    Onset of effect after injections:  Within 2 weeks    Result from last injections:  Reported improvement in jaw opening/closing.    Wearing off: uncertain    Side effects: She reports she notices a difference with her smile.     Additional interval history: She skipped a cycle of Botox to see if this would affect her condition.   Hypnosis:    Patient denies new medical diagnoses, illnesses, hospitalizations, emergency room visits, and injuries since the previous injection with botulinum neurotoxin.    ROS:  Other than that mentioned above, the remainder of 12 systems reviewed were negative.    Current Outpatient Medications   Medication Sig Dispense Refill    famotidine (PEPCID) 20 MG tablet Take 20 mg by mouth daily as needed       ibuprofen (ADVIL/MOTRIN) 200 MG tablet Take 200 mg by mouth every 4 hours as needed for mild pain         Allergies: She has No Known Allergies.    Past Medical History:   Diagnosis Date    CLL (chronic lymphocytic leukemia) (H)     Oromandibular dystonia 3/23/2020       Past Surgical History:   Procedure Laterality Date    COLONOSCOPY      GI SURGERY      HERNIA REPAIR Right     herniated intestine right       Social History     Socioeconomic History    Marital status:      Spouse name: Not on file    Number of children: Not on file    Years of education: Not on file    Highest education  level: Not on file   Occupational History    Not on file   Tobacco Use    Smoking status: Never    Smokeless tobacco: Never   Vaping Use    Vaping status: Not on file   Substance and Sexual Activity    Alcohol use: Yes     Alcohol/week: 1.0 standard drink of alcohol     Types: 1 Glasses of wine per week    Drug use: No    Sexual activity: Not on file   Other Topics Concern    Not on file   Social History Narrative    Not on file     Social Determinants of Health     Financial Resource Strain: Not on file   Food Insecurity: Not on file   Transportation Needs: Not on file   Physical Activity: Not on file   Stress: Not on file   Social Connections: Not on file   Intimate Partner Violence: Not on file   Housing Stability: Not on file       Family History   Problem Relation Age of Onset    Thyroid Cancer Mother     Hypertension Mother     Cerebrovascular Disease Mother     Cancer Father     Esophageal Cancer Father     Prostate Cancer Father     Hypertension Sister     Thyroid Disease Sister     Melanoma Brother     Hypertension Brother        Physical Examination:  Vital Signs:   vitals were not taken for this visit.   Difficulty closing jaw completely     BOTULINUM NEUROTOXIN INJECTION PROCEDURES:    VERIFICATION OF PATIENT IDENTIFICATION AND PROCEDURE     Initials   Patient Name kd   Patient  kd   Procedure Verified by: merlin     Prior to the start of the procedure and with procedural staff participation, I verbally confirmed the patient s identity using two indicators, relevant allergies, that the procedure was appropriate and matched the consent or emergent situation, and that the correct equipment/implants were available. Immediately prior to starting the procedure I conducted the Time Out with the procedural staff and re-confirmed the patient s name, procedure, and site/side. (The Joint Commission universal protocol was followed.)  Yes    Sedation (Moderate or Deep): None      Above assessments performed  by:    Lauren Pierre DO      INDICATION/S FOR PROCEDURE/S:  Kassidy Guajardo is a 62 year old year old patient with dystonia affecting the  jaw muscles secondary to a diagnosis of oromandibular dystonia with associated  loss of volitional motor control.      Her baseline symptoms have been recalcitrant to oral medications and conservative therapy.  She is here today for an injection of Botox.       GOAL OF PROCEDURE:  The goal of this procedure is to improve volitional motor control associated with dystonic movements.    TOTAL DOSE ADMINISTERED:  Dose Administered:  70 units Botox    Diluent Used:  Preservative Free Normal Saline  Total Volume of Diluent Used:  1 mL/100 units    CONSENT:  The risks, benefits, and treatment options were discussed with Kassidy Guajardo and she agreed to proceed.      Written consent was obtained by kd.     EQUIPMENT USED:  Needle-37mm stimulating/recording  EMG/NCS Machine     SKIN PREPARATION:  Skin preparation was performed using an alcohol wipe.     GUIDANCE DESCRIPTION:  Electro-myographic guidance was necessary throughout the procedure to accurately identify all areas of dystonic muscles while avoiding injection of non-dystonic muscles, neighboring nerves and nearby vascular structures.      AREA/MUSCLE INJECTED:       Muscles Injected Units Injected Number of Injections   Left lateral pterygoid 25 (30) 1   Left digastric, anterior belly 10 (10) 1           Right lateral pterygoid 25 (30) 1   Right digastric, anterior belly 10 (10) 1           Total Units Injected: 70     Unavoidable Waste: 30     Total Units Billed 100     ( ) = previous dose  Bold = changes made this visit    The patient tolerated the injections without difficulty.      Assessment:    Kassidy Guajardo is a 62 year old female with  Idiopathic oromandibular dystonia with jaw opening.  Today we did repeat botulinum toxin injections.    Plan  Message via [employer] or take a video of movements in 3 weeks for an  update  Avoid heat and cold pack and massaging the areas injected for 24 hours post injections  Follow-up in 3 months' time to consider repeat injections            Again, thank you for allowing me to participate in the care of your patient.      Sincerely,    Lauren Pierre DO

## 2023-05-12 NOTE — PROGRESS NOTES
Movement Disorders Botulinum Toxin Clinic Note    History of Present Illness:  Kassidy Guajardo is a 62 year old female who presents to clinic for botulinum toxin injections for treatment of Idiopathic oromandibular dystonia with jaw opening.     Date of last injection: 10/21/22    Onset of effect after injections:  Within 2 weeks    Result from last injections:  Reported improvement in jaw opening/closing.    Wearing off: uncertain    Side effects: She reports she notices a difference with her smile.     Additional interval history: She skipped a cycle of Botox to see if this would affect her condition.   Hypnosis:    Patient denies new medical diagnoses, illnesses, hospitalizations, emergency room visits, and injuries since the previous injection with botulinum neurotoxin.    ROS:  Other than that mentioned above, the remainder of 12 systems reviewed were negative.    Current Outpatient Medications   Medication Sig Dispense Refill     famotidine (PEPCID) 20 MG tablet Take 20 mg by mouth daily as needed        ibuprofen (ADVIL/MOTRIN) 200 MG tablet Take 200 mg by mouth every 4 hours as needed for mild pain         Allergies: She has No Known Allergies.    Past Medical History:   Diagnosis Date     CLL (chronic lymphocytic leukemia) (H)      Oromandibular dystonia 3/23/2020       Past Surgical History:   Procedure Laterality Date     COLONOSCOPY       GI SURGERY       HERNIA REPAIR Right     herniated intestine right       Social History     Socioeconomic History     Marital status:      Spouse name: Not on file     Number of children: Not on file     Years of education: Not on file     Highest education level: Not on file   Occupational History     Not on file   Tobacco Use     Smoking status: Never     Smokeless tobacco: Never   Vaping Use     Vaping status: Not on file   Substance and Sexual Activity     Alcohol use: Yes     Alcohol/week: 1.0 standard drink of alcohol     Types: 1 Glasses of wine per week      Drug use: No     Sexual activity: Not on file   Other Topics Concern     Not on file   Social History Narrative     Not on file     Social Determinants of Health     Financial Resource Strain: Not on file   Food Insecurity: Not on file   Transportation Needs: Not on file   Physical Activity: Not on file   Stress: Not on file   Social Connections: Not on file   Intimate Partner Violence: Not on file   Housing Stability: Not on file       Family History   Problem Relation Age of Onset     Thyroid Cancer Mother      Hypertension Mother      Cerebrovascular Disease Mother      Cancer Father      Esophageal Cancer Father      Prostate Cancer Father      Hypertension Sister      Thyroid Disease Sister      Melanoma Brother      Hypertension Brother        Physical Examination:  Vital Signs:   vitals were not taken for this visit.   Difficulty closing jaw completely     BOTULINUM NEUROTOXIN INJECTION PROCEDURES:    VERIFICATION OF PATIENT IDENTIFICATION AND PROCEDURE     Initials   Patient Name kd   Patient  kd   Procedure Verified by: merlin     Prior to the start of the procedure and with procedural staff participation, I verbally confirmed the patient s identity using two indicators, relevant allergies, that the procedure was appropriate and matched the consent or emergent situation, and that the correct equipment/implants were available. Immediately prior to starting the procedure I conducted the Time Out with the procedural staff and re-confirmed the patient s name, procedure, and site/side. (The Joint Commission universal protocol was followed.)  Yes    Sedation (Moderate or Deep): None      Above assessments performed by:    Lauren Pierre DO      INDICATION/S FOR PROCEDURE/S:  Kassidy Guajardo is a 62 year old year old patient with dystonia affecting the  jaw muscles secondary to a diagnosis of oromandibular dystonia with associated  loss of volitional motor control.      Her baseline symptoms have been  recalcitrant to oral medications and conservative therapy.  She is here today for an injection of Botox.       GOAL OF PROCEDURE:  The goal of this procedure is to improve volitional motor control associated with dystonic movements.    TOTAL DOSE ADMINISTERED:  Dose Administered:  70 units Botox    Diluent Used:  Preservative Free Normal Saline  Total Volume of Diluent Used:  1 mL/100 units    CONSENT:  The risks, benefits, and treatment options were discussed with Kassidy Guajardo and she agreed to proceed.      Written consent was obtained by kd.     EQUIPMENT USED:  Needle-37mm stimulating/recording  EMG/NCS Machine     SKIN PREPARATION:  Skin preparation was performed using an alcohol wipe.     GUIDANCE DESCRIPTION:  Electro-myographic guidance was necessary throughout the procedure to accurately identify all areas of dystonic muscles while avoiding injection of non-dystonic muscles, neighboring nerves and nearby vascular structures.      AREA/MUSCLE INJECTED:       Muscles Injected Units Injected Number of Injections   Left lateral pterygoid 25 (30) 1   Left digastric, anterior belly 10 (10) 1           Right lateral pterygoid 25 (30) 1   Right digastric, anterior belly 10 (10) 1           Total Units Injected: 70     Unavoidable Waste: 30     Total Units Billed 100     ( ) = previous dose  Bold = changes made this visit    The patient tolerated the injections without difficulty.      Assessment:    Kassidy Guajardo is a 62 year old female with  Idiopathic oromandibular dystonia with jaw opening.  Today we did repeat botulinum toxin injections.    Plan  Message via [employer] or take a video of movements in 3 weeks for an update  Avoid heat and cold pack and massaging the areas injected for 24 hours post injections  Follow-up in 3 months' time to consider repeat injections    Lauren Pierre DO   of Neurology   [facility]

## 2023-08-04 ENCOUNTER — OFFICE VISIT (OUTPATIENT)
Dept: NEUROLOGY | Facility: CLINIC | Age: 63
End: 2023-08-04
Payer: COMMERCIAL

## 2023-08-04 VITALS
SYSTOLIC BLOOD PRESSURE: 149 MMHG | OXYGEN SATURATION: 100 % | WEIGHT: 168.7 LBS | BODY MASS INDEX: 26.42 KG/M2 | DIASTOLIC BLOOD PRESSURE: 86 MMHG | HEART RATE: 60 BPM

## 2023-08-04 DIAGNOSIS — G24.4 OROMANDIBULAR DYSTONIA: Primary | ICD-10-CM

## 2023-08-04 DIAGNOSIS — K08.89 DIFFICULTY CHEWING: ICD-10-CM

## 2023-08-04 PROCEDURE — 64612 DESTROY NERVE FACE MUSCLE: CPT | Mod: 50 | Performed by: PSYCHIATRY & NEUROLOGY

## 2023-08-04 PROCEDURE — 95874 GUIDE NERV DESTR NEEDLE EMG: CPT | Performed by: PSYCHIATRY & NEUROLOGY

## 2023-08-04 ASSESSMENT — PAIN SCALES - GENERAL: PAINLEVEL: NO PAIN (0)

## 2023-08-04 NOTE — PROGRESS NOTES
Movement Disorders Botulinum Toxin Clinic Note    History of Present Illness:  Kassidy Guajardo is a 63 year old female who presents to clinic for botulinum toxin injections for treatment of  Idiopathic oromandibular dystonia with jaw opening.     Date of last injection: 10/21/22    Onset of effect after injections:  a week    Result from last injections:  Reported improvement in jaw opening but jaw still wants to open and she has a hard time completing a bite and takes 3-4 seconds before she can complete the bite.    Wearing off: She noticed wearing off a couple weeks ago    Side effects: She reports none    Additional interval history: none    Patient denies new medical diagnoses, illnesses, hospitalizations, emergency room visits, and injuries since the previous injection with botulinum neurotoxin.    Physical Examination:  Vital Signs:   vitals were not taken for this visit.   Difficulty closing jaw completely     BOTULINUM NEUROTOXIN INJECTION PROCEDURES:    VERIFICATION OF PATIENT IDENTIFICATION AND PROCEDURE     Initials   Patient Name kd   Patient  kd   Procedure Verified by: merlin     Prior to the start of the procedure and with procedural staff participation, I verbally confirmed the patient s identity using two indicators, relevant allergies, that the procedure was appropriate and matched the consent or emergent situation, and that the correct equipment/implants were available. Immediately prior to starting the procedure I conducted the Time Out with the procedural staff and re-confirmed the patient s name, procedure, and site/side. (The Joint Commission universal protocol was followed.)  Yes    Sedation (Moderate or Deep): None      Above assessments performed by:  Lauren Pierre DO    INDICATION/S FOR PROCEDURE/S:  Kassidy Guajardo is a 63 year old year old patient with dystonia affecting the  jaw muscles secondary to a diagnosis of oromandibular dystonia with associated  loss of joint motion, loss of  volitional motor control, and difficulty with chewing .     Her baseline symptoms have been recalcitrant to oral medications and conservative therapy.  She is here today for an injection of Botox.      GOAL OF PROCEDURE:  The goal of this procedure is to improve volitional motor control and improve chewing and closing jaw associated with dystonic movements.    TOTAL DOSE ADMINISTERED:  Dose Administered:  80 units Botox    Diluent Used:  Preservative Free Normal Saline  Total Volume of Diluent Used:  1 mL/100 units    CONSENT:  The risks, benefits, and treatment options were discussed with Kassidy Guajardo and she agreed to proceed.      Written consent was obtained by kd.     EQUIPMENT USED:  Needle-25mm stimulating/recording  EMG/NCS Machine    SKIN PREPARATION:  Skin preparation was performed using an alcohol wipe.    GUIDANCE DESCRIPTION:  Electro-myographic guidance was necessary throughout the procedure to accurately identify all areas of dystonic muscles while avoiding injection of non-dystonic muscles, neighboring nerves and nearby vascular structures.     AREA/MUSCLE INJECTED:    Muscles Injected Units Injected Number of Injections   Left lateral pterygoid 30 (25) 1   Left digastric, anterior belly 10 (10) 1           Right lateral pterygoid 30 (25) 1   Right digastric, anterior belly 10 (10) 1           Total Units Injected: 80     Unavoidable Waste: 20     Total Units Billed 100       ( ) = previous dose  Bold = changes made this visit    The patient tolerated the injections without difficulty.    Assessment:    Kassidy Guajardo is a 63 year old female with  Idiopathic oromandibular dystonia with jaw opening.  Today we did repeat botulinum toxin injections.    Plan  Message via [employer] or take a video of movements in 3 weeks for an update  Avoid heat and cold pack and massaging the areas injected for 24 hours post injections  Follow-up in 4 months' time to consider repeat injections  Dental referral for  evaluation/management of teeth mal-alignment and if this is contributing to oromandibular dystonia     Lauren Pierre DO   of Neurology   [facility]    40 minutes spent on date of encounter doing chart reviews and exam and documentation and further activities as noted above.

## 2023-08-04 NOTE — LETTER
2023       RE: Kassidy Guajardo  [id number] [id number]       Dear Colleague,    Thank you for referring your patient, Kassidy Guajardo, to the [facility] [place] at [facility]. Please see a copy of my visit note below.    Movement Disorders Botulinum Toxin Clinic Note    History of Present Illness:  Kassidy Guajardo is a 63 year old female who presents to clinic for botulinum toxin injections for treatment of  Idiopathic oromandibular dystonia with jaw opening.     Date of last injection: 10/21/22    Onset of effect after injections:  a week    Result from last injections:  Reported improvement in jaw opening but jaw still wants to open and she has a hard time completing a bite and takes 3-4 seconds before she can complete the bite.    Wearing off: She noticed wearing off a couple weeks ago    Side effects: She reports none    Additional interval history: none    Patient denies new medical diagnoses, illnesses, hospitalizations, emergency room visits, and injuries since the previous injection with botulinum neurotoxin.    Physical Examination:  Vital Signs:   vitals were not taken for this visit.   Difficulty closing jaw completely     BOTULINUM NEUROTOXIN INJECTION PROCEDURES:    VERIFICATION OF PATIENT IDENTIFICATION AND PROCEDURE     Initials   Patient Name kd   Patient  kd   Procedure Verified by: kd     Prior to the start of the procedure and with procedural staff participation, I verbally confirmed the patient s identity using two indicators, relevant allergies, that the procedure was appropriate and matched the consent or emergent situation, and that the correct equipment/implants were available. Immediately prior to starting the procedure I conducted the Time Out with the procedural staff and re-confirmed the patient s name, procedure, and site/side. (The Joint Commission universal protocol was followed.)   Yes    Sedation (Moderate or Deep): None      Above assessments performed by:  Lauren Pierre DO    INDICATION/S FOR PROCEDURE/S:  Kassidy Guajardo is a 63 year old year old patient with dystonia affecting the  jaw muscles secondary to a diagnosis of oromandibular dystonia with associated  loss of joint motion, loss of volitional motor control, and difficulty with chewing .     Her baseline symptoms have been recalcitrant to oral medications and conservative therapy.  She is here today for an injection of Botox.      GOAL OF PROCEDURE:  The goal of this procedure is to improve volitional motor control and improve chewing and closing jaw associated with dystonic movements.    TOTAL DOSE ADMINISTERED:  Dose Administered:  80 units Botox    Diluent Used:  Preservative Free Normal Saline  Total Volume of Diluent Used:  1 mL/100 units    CONSENT:  The risks, benefits, and treatment options were discussed with Kassidy Guajardo and she agreed to proceed.      Written consent was obtained by kd.     EQUIPMENT USED:  Needle-25mm stimulating/recording  EMG/NCS Machine    SKIN PREPARATION:  Skin preparation was performed using an alcohol wipe.    GUIDANCE DESCRIPTION:  Electro-myographic guidance was necessary throughout the procedure to accurately identify all areas of dystonic muscles while avoiding injection of non-dystonic muscles, neighboring nerves and nearby vascular structures.     AREA/MUSCLE INJECTED:    Muscles Injected Units Injected Number of Injections   Left lateral pterygoid 30 (25) 1   Left digastric, anterior belly 10 (10) 1           Right lateral pterygoid 30 (25) 1   Right digastric, anterior belly 10 (10) 1           Total Units Injected: 80     Unavoidable Waste: 20     Total Units Billed 100       ( ) = previous dose  Bold = changes made this visit    The patient tolerated the injections without difficulty.    Assessment:    Kassidy Guajardo is a 63 year old female with  Idiopathic oromandibular  dystonia with jaw opening.  Today we did repeat botulinum toxin injections.    Plan  Message via [employer] or take a video of movements in 3 weeks for an update  Avoid heat and cold pack and massaging the areas injected for 24 hours post injections  Follow-up in 4 months' time to consider repeat injections  Dental referral for evaluation/management of teeth mal-alignment and if this is contributing to oromandibular dystonia       40 minutes spent on date of encounter doing chart reviews and exam and documentation and further activities as noted above.           Again, thank you for allowing me to participate in the care of your patient.      Sincerely,    Lauren Pierre DO

## 2023-08-04 NOTE — PATIENT INSTRUCTIONS
Plan  Message via [employer] or take a video of movements in 3 weeks for an update  Avoid heat and cold pack and massaging the areas injected for 24 hours post injections  Follow-up in 4 months' time to consider repeat injections  Dental referral for evaluation/management of teeth mal-alignment and if this is contributing to oromandibular dystonia

## 2023-08-31 ENCOUNTER — TELEPHONE (OUTPATIENT)
Dept: NEUROLOGY | Facility: CLINIC | Age: 63
End: 2023-08-31

## 2023-08-31 NOTE — TELEPHONE ENCOUNTER
[facility] Call Center    Phone Message    May a detailed message be left on voicemail: yes     Reason for Call: Appointment Intake    Referring Provider Name: Lauren Pierre    Diagnosis and/or Symptoms: Botox Injections    Pt is requesting a call back for advice on scheduling future botox injection appts. Pt has questions about scheduling with another provider.    Please reach out to patient to further advise at  # [phone]    Action Taken: Message routed to:  [facility] neurology    Travel Screening: Not Applicable

## 2023-09-05 NOTE — TELEPHONE ENCOUNTER
She had a neurotoxin appointment on 10/27 and a few following this in 3 month intervals however the appointment had to be canceled. She was told Dr. Villegas is booking out until October 2024. She asks if there is a plan in place to help her reschedule or if Dr. Pierre recommends scheduling with someone else. She is willing to go to [place]. Discussed ehr Fridays are moving to that location and she will be able to reschedule there, otherwise Dr. Guerra in [facility] is willing to see her. I will have a  call her. She prefers a call after 2:45 PM. If she has further questions or concerns she may send a message to Dr. Pierre and put my name in the first sentence and it will get to me.

## 2023-09-05 NOTE — TELEPHONE ENCOUNTER
Spoke with patient. Rescheduled her appointments to Dr. Pierre's [place] schedule as follows:     10/27/23 at 8:45am  12/1/23 at 11:30am  4/5/24 at 11:30am    Patient stated that after 10/27 she will decide if she would like to continue coming to Dr. Pierre in [place]. Will call to cancel if she decides to go with Dr. Guerra instead.    Jas Vargas on 9/5/2023 at 5:29 PM

## 2023-09-06 NOTE — TELEPHONE ENCOUNTER
Per Sangita Sanchez RN - need to reschedule so appts are 12 weeks apart.     Spoke with patient, rescheduled to 1/19/2024 and 4/19/2024 both at 11:30am in [place]. Patient asked about Dental referral, writer provided phone # for [facility] dental clinic [phone].    Jas Vargas on 9/6/2023 at 3:03 PM

## 2023-10-02 ENCOUNTER — TELEPHONE (OUTPATIENT)
Dept: NEUROLOGY | Facility: CLINIC | Age: 63
End: 2023-10-02
Payer: COMMERCIAL

## 2023-10-02 NOTE — TELEPHONE ENCOUNTER
[facility] Call Center    Phone Message    May a detailed message be left on voicemail: yes     Reason for Call: Other: Patient is calling to discuss botox/recommendations from last appt. Patient would like to discuss referral that was placed for dentist. Please contact patient back at [phone]     Action Taken: Message routed to:  [facility] Neurology    Travel Screening: Not Applicable

## 2023-10-03 NOTE — TELEPHONE ENCOUNTER
[facility] Call Center    Phone Message    May a detailed message be left on voicemail: yes     Reason for Call: Other: Pt called return VM left by Sangita Sanchez RN.  Please call Pt back at [phone] and best times to reach Pt would be anytime before 4:55     Action Taken: Message routed to:  [facility] Neurology    Travel Screening: Not Applicable

## 2023-10-05 NOTE — TELEPHONE ENCOUNTER
Kassidy notes she tried calling the number given for [facility] dental clinics and it said they closed in June. She called the [facility] and they were not sure what she looking to be evaluated for. We will send the referral over to them and call her back to let her know when this is completed. List noted it is okay to leave a detailed message on her phone if we cannot reach her.

## 2023-10-09 NOTE — TELEPHONE ENCOUNTER
[facility] [employer] Phone: [phone]  Fax: [phone]    Referral faxed to [facility] [employer]. Left a message for Kassidy with the scheduling number.

## 2023-11-16 ENCOUNTER — APPOINTMENT (OUTPATIENT)
Dept: GENERAL RADIOLOGY | Facility: CLINIC | Age: 63
End: 2023-11-16
Payer: COMMERCIAL

## 2023-11-16 ENCOUNTER — HOSPITAL ENCOUNTER (EMERGENCY)
Facility: CLINIC | Age: 63
Discharge: HOME | End: 2023-11-17
Attending: EMERGENCY MEDICINE | Admitting: EMERGENCY MEDICINE
Payer: COMMERCIAL

## 2023-11-16 DIAGNOSIS — R25.2 LEG CRAMPING: ICD-10-CM

## 2023-11-16 DIAGNOSIS — R03.0 ELEVATED BLOOD PRESSURE READING: ICD-10-CM

## 2023-11-16 DIAGNOSIS — R51.9 HEADACHE: ICD-10-CM

## 2023-11-16 DIAGNOSIS — C91.10 CLL (CHRONIC LYMPHOCYTIC LEUKEMIA) (H): ICD-10-CM

## 2023-11-16 DIAGNOSIS — R07.9 CHEST PAIN: ICD-10-CM

## 2023-11-16 LAB
ALBUMIN SERPL BCG-MCNC: 4.7 G/DL (ref 3.5–5.2)
ALP SERPL-CCNC: 57 U/L (ref 40–150)
ALT SERPL W P-5'-P-CCNC: 14 U/L (ref 0–50)
ANION GAP SERPL CALCULATED.3IONS-SCNC: 7 MMOL/L (ref 7–15)
AST SERPL W P-5'-P-CCNC: 17 U/L (ref 0–45)
BASOPHILS # BLD AUTO: ABNORMAL 10*3/UL
BASOPHILS # BLD MANUAL: 0 10E3/UL (ref 0–0.2)
BASOPHILS NFR BLD AUTO: ABNORMAL %
BASOPHILS NFR BLD MANUAL: 0 %
BILIRUB SERPL-MCNC: 0.5 MG/DL
BUN SERPL-MCNC: 19.6 MG/DL (ref 8–23)
CALCIUM SERPL-MCNC: 9.2 MG/DL (ref 8.8–10.2)
CHLORIDE SERPL-SCNC: 105 MMOL/L (ref 98–107)
CREAT SERPL-MCNC: 0.79 MG/DL (ref 0.51–0.95)
D DIMER PPP FEU-MCNC: <0.27 UG/ML FEU (ref 0–0.5)
DEPRECATED HCO3 PLAS-SCNC: 27 MMOL/L (ref 22–29)
EGFRCR SERPLBLD CKD-EPI 2021: 83 ML/MIN/1.73M2
EOSINOPHIL # BLD AUTO: ABNORMAL 10*3/UL
EOSINOPHIL # BLD MANUAL: 0 10E3/UL (ref 0–0.7)
EOSINOPHIL NFR BLD AUTO: ABNORMAL %
EOSINOPHIL NFR BLD MANUAL: 0 %
ERYTHROCYTE [DISTWIDTH] IN BLOOD BY AUTOMATED COUNT: 16.9 % (ref 10–15)
GLUCOSE SERPL-MCNC: 98 MG/DL (ref 70–99)
HCT VFR BLD AUTO: 40.7 % (ref 35–47)
HGB BLD-MCNC: 12 G/DL (ref 11.7–15.7)
HOLD SPECIMEN: NORMAL
HOLD SPECIMEN: NORMAL
IMM GRANULOCYTES # BLD: ABNORMAL 10*3/UL
IMM GRANULOCYTES NFR BLD: ABNORMAL %
LYMPHOCYTES # BLD AUTO: ABNORMAL 10*3/UL
LYMPHOCYTES # BLD MANUAL: 100.5 10E3/UL (ref 0.8–5.3)
LYMPHOCYTES NFR BLD AUTO: ABNORMAL %
LYMPHOCYTES NFR BLD MANUAL: 90 %
MCH RBC QN AUTO: 26.1 PG (ref 26.5–33)
MCHC RBC AUTO-ENTMCNC: 29.5 G/DL (ref 31.5–36.5)
MCV RBC AUTO: 89 FL (ref 78–100)
MONOCYTES # BLD AUTO: ABNORMAL 10*3/UL
MONOCYTES # BLD MANUAL: 3.4 10E3/UL (ref 0–1.3)
MONOCYTES NFR BLD AUTO: ABNORMAL %
MONOCYTES NFR BLD MANUAL: 3 %
NEUTROPHILS # BLD AUTO: ABNORMAL 10*3/UL
NEUTROPHILS # BLD MANUAL: 7.8 10E3/UL (ref 1.6–8.3)
NEUTROPHILS NFR BLD AUTO: ABNORMAL %
NEUTROPHILS NFR BLD MANUAL: 7 %
NRBC # BLD AUTO: 0 10E3/UL
NRBC BLD AUTO-RTO: 0 /100
PLAT MORPH BLD: ABNORMAL
PLATELET # BLD AUTO: 141 10E3/UL (ref 150–450)
POTASSIUM SERPL-SCNC: 4 MMOL/L (ref 3.4–5.3)
POTASSIUM SERPL-SCNC: NORMAL MMOL/L
PROT SERPL-MCNC: 6.7 G/DL (ref 6.4–8.3)
RBC # BLD AUTO: 4.59 10E6/UL (ref 3.8–5.2)
RBC MORPH BLD: ABNORMAL
SMUDGE CELLS BLD QL SMEAR: PRESENT
SODIUM SERPL-SCNC: 139 MMOL/L (ref 135–145)
TROPONIN T SERPL HS-MCNC: 10 NG/L
TROPONIN T SERPL HS-MCNC: 31 NG/L
VARIANT LYMPHS BLD QL SMEAR: PRESENT
WBC # BLD AUTO: 111.7 10E3/UL (ref 4–11)

## 2023-11-16 PROCEDURE — 36415 COLL VENOUS BLD VENIPUNCTURE: CPT | Performed by: EMERGENCY MEDICINE

## 2023-11-16 PROCEDURE — 82947 ASSAY GLUCOSE BLOOD QUANT: CPT | Performed by: EMERGENCY MEDICINE

## 2023-11-16 PROCEDURE — 82435 ASSAY OF BLOOD CHLORIDE: CPT | Performed by: EMERGENCY MEDICINE

## 2023-11-16 PROCEDURE — 36415 COLL VENOUS BLD VENIPUNCTURE: CPT

## 2023-11-16 PROCEDURE — 84484 ASSAY OF TROPONIN QUANT: CPT

## 2023-11-16 PROCEDURE — 82374 ASSAY BLOOD CARBON DIOXIDE: CPT | Performed by: EMERGENCY MEDICINE

## 2023-11-16 PROCEDURE — 71046 X-RAY EXAM CHEST 2 VIEWS: CPT

## 2023-11-16 PROCEDURE — 85007 BL SMEAR W/DIFF WBC COUNT: CPT | Performed by: EMERGENCY MEDICINE

## 2023-11-16 PROCEDURE — 85027 COMPLETE CBC AUTOMATED: CPT | Performed by: EMERGENCY MEDICINE

## 2023-11-16 PROCEDURE — 85379 FIBRIN DEGRADATION QUANT: CPT

## 2023-11-16 PROCEDURE — 93005 ELECTROCARDIOGRAM TRACING: CPT

## 2023-11-16 PROCEDURE — 84450 TRANSFERASE (AST) (SGOT): CPT | Performed by: EMERGENCY MEDICINE

## 2023-11-16 PROCEDURE — 99285 EMERGENCY DEPT VISIT HI MDM: CPT | Mod: 25

## 2023-11-16 ASSESSMENT — ACTIVITIES OF DAILY LIVING (ADL)
ADLS_ACUITY_SCORE: 35
ADLS_ACUITY_SCORE: 35

## 2023-11-16 NOTE — Clinical Note
Kassidy Guajardo was seen and treated in our emergency department on 11/16/2023.  She may return to work on 11/18/2023.       If you have any questions or concerns, please don't hesitate to call.      Salud Fried PA-C

## 2023-11-17 VITALS
RESPIRATION RATE: 18 BRPM | TEMPERATURE: 98.5 F | HEART RATE: 98 BPM | DIASTOLIC BLOOD PRESSURE: 90 MMHG | OXYGEN SATURATION: 95 % | SYSTOLIC BLOOD PRESSURE: 154 MMHG

## 2023-11-17 LAB
ATRIAL RATE - MUSE: 93 BPM
DIASTOLIC BLOOD PRESSURE - MUSE: NORMAL MMHG
INTERPRETATION ECG - MUSE: NORMAL
P AXIS - MUSE: 49 DEGREES
PR INTERVAL - MUSE: 164 MS
QRS DURATION - MUSE: 84 MS
QT - MUSE: 356 MS
QTC - MUSE: 442 MS
R AXIS - MUSE: 23 DEGREES
SYSTOLIC BLOOD PRESSURE - MUSE: NORMAL MMHG
T AXIS - MUSE: 32 DEGREES
VENTRICULAR RATE- MUSE: 93 BPM

## 2023-11-17 NOTE — DISCHARGE INSTRUCTIONS
You were seen today for elevated blood pressure, chest pressure, leg cramping, and a headache.  Work up today was reassuring.  I recommend the following:    Consider trying compression stockings  Follow up with your primary care provider to discuss starting a blood pressure medication.  Your blood pressure today is not elevated to the point of requiring IV medication intervention, but you may benefit from a daily anti-hypertensive medication  Alternate Tylenol and ibuprofen for pains  Ibuprofen 600 mg every 6 hours  Tylenol 1000 mg every 8 hours  Certainly return for severe headaches that do not go away, vision changes, worsening chest pain, or fevers    Discharge Instructions  Chest Pain    You have been seen today for chest pain or discomfort.  At this time, your provider has found no signs that your chest pain is due to a serious or life-threatening condition, (or you have declined more testing and/or admission to the hospital). However, sometimes there is a serious problem that does not show up right away. Your evaluation today may not be complete and you may need further testing and evaluation.     Generally, every Emergency Department visit should have a follow-up clinic visit with either a primary or a specialty clinic/provider. Please follow-up as instructed by your emergency provider today.  Return to the Emergency Department if:  Your chest pain changes, gets worse, starts to happen more often, or comes with less activity.  You are newly short of breath.  You get very weak or tired.  You pass out or faint.  You have any new symptoms, like fever, cough, numb legs, or you cough up blood.  You have anything else that worries you.    Until you follow-up with your regular provider, please do the following:  Take one aspirin daily unless you have an allergy or are told not to by your provider.  If a stress test appointment has been made, go to the appointment.  If you have questions, contact your regular  provider.  Follow-up with your regular provider/clinic as directed; this is very important.    If you were given a prescription for medicine here today, be sure to read all of the information (including the package insert) that comes with your prescription.  This will include important information about the medicine, its side effects, and any warnings that you need to know about.  The pharmacist who fills the prescription can provide more information and answer questions you may have about the medicine.  If you have questions or concerns that the pharmacist cannot address, please call or return to the Emergency Department.       Remember that you can always come back to the Emergency Department if you are not able to see your regular provider in the amount of time listed above, if you get any new symptoms, or if there is anything that worries you.    Discharge Instructions  Hypertension - High Blood Pressure    During you visit to the Emergency Department, your blood pressure was higher than the recommended blood pressure.  This may be related to stress, pain, medication or other temporary conditions. In these cases, your blood pressure may return to normal on its own. If you have a history of high blood pressure, you may need to have your provider adjust your medications. Sometimes, your high measurement here may indicate that you have developed high blood pressure that will stay high unless it is treated. As a general rule, high blood pressure causes problems over years rather than days, weeks, or months. So, while it is important to treat blood pressure, it is rarely important to treat blood pressure immediately. Occasionally we will begin a medication in the Emergency Department; more often we will recommend close follow-up for medications with a primary doctor/clinic.    Generally, every Emergency Department visit should have a follow-up clinic visit with either a primary or a specialty clinic/provider. Please  follow-up as instructed by your emergency provider today.    Return to the Emergency Department if you start to have:  A severe headache.  Chest pain.  Shortness of breath.  Weakness or numbness that affects one part of the body.  Confusion.  Vision changes.  Significant swelling of legs and/or eyes.  A reaction to any medication started in the Emergency Department.    What can I do to help myself?  Avoid alcohol.  Take any blood pressure medicine that you are prescribed.  Get a good night s sleep.  Lower your salt intake.  Exercise.  Lose weight.  Manage stress.  See your doctor regularly    If blood pressure medication was started in the Emergency Department:  The medicine may not have an immediate effect. The body and brain determine what blood pressure you have. The medicine s job is to retrain the body s  thermostat  to a lower blood pressure.  You will need to follow up with your provider to see how this medicine is working for you.  If you were given a prescription for medicine here today, be sure to read all of the information (including the package insert) that comes with your prescription.  This will include important information about the medicine, its side effects, and any warnings that you need to know about.  The pharmacist who fills the prescription can provide more information and answer questions you may have about the medicine.  If you have questions or concerns that the pharmacist cannot address, please call or return to the Emergency Department.   Remember that you can always come back to the Emergency Department if you are not able to see your regular provider in the amount of time listed above, if you get any new symptoms, or if there is anything that worries you.

## 2023-11-17 NOTE — ED PROVIDER NOTES
ED ATTENDING PHYSICIAN NOTE:   I evaluated this patient in conjunction with Salud Fried PA-C  I have participated in the care of the patient and personally performed key elements of the history, exam, and medical decision making.      HPI:   Kassidy Guajardo is a 63 year old female with h/o CLL who presents to the ED with concerns for elevated blood pressure noted today as well as chest pressure since yesterday, constant since, without radiation. No associated fever/chills/cough/congestion. She notes intermittent bialteral leg cramping for weeks. Headache today noted for 4 hours but gone now without other associated similar symptoms. She notes significant situational stressors. She has no diagnosed HTN.     Independent Historian:   None - Patient Only    Review of External Notes: Nurse triage note reviewed from family medicine earlier today. Clinic notes reviewed.     PMH and medications reviewed.      EXAM:   BP (!) 176/107   Pulse 88   Temp 98.5  F (36.9  C) (Temporal)   Resp 18   SpO2 97%   General: Adult female sitting upright  Eyes: PERRL, Conjunctive within normal limits  ENT: Moist mucous membranes, oropharynx clear.   CV: Normal S1S2, no murmur, rub or gallop. Regular rate and rhythm  Resp: Clear to auscultation bilaterally, no wheezes, rales or rhonchi. Normal respiratory effort.  MSK: No edema. Nontender. Normal active range of motion.  Skin: Warm and dry. No rashes or lesions or ecchymoses on visible skin.  Neuro: Alert and oriented. Responds appropriately to all questions and commands. No focal findings appreciated. Normal muscle tone.  Psych: Normal mood and affect. Pleasant.     Independent Interpretation (X-rays, CTs, rhythm strip):  Reviewed the patient's chest x-ray.  No pneumothorax, pleural effusion, infiltrate.    Consultations/Discussion of Management or Tests:  None     Social Determinants of Health affecting care:   None     MEDICAL DECISION MAKING/ASSESSMENT AND PLAN:   Kassidy Guajardo is  a 63-year-old female who presents emergency department with multiple concerns, most of which are chronic.  Blood pressure was her main concern.  Over her stay, she downtrended into still elevated but reasonable range.  She had no laboratory abnormalities.  She had noted some chest tightness, evaluation was undertaken to make the likelihood of acute life-threatening pathology lesser.  Fortunately, screening tests are reassuring with regards to PE and she does not have an elevated troponin.  She seems otherwise low risk and appropriate for further evaluation as an outpatient.  Laboratory assessment was reassuring with baseline leukocytosis from CLL noted.  With regards to her leg cramping, this is a chronic issue for her.  This could be related to dehydration.  Electrolytes do not show any derangements.  Outpatient evaluation also seems reasonable for this.  She understands the importance of close follow-up.  Return precautions discussed.  All questions answered prior to discharge     DIAGNOSIS:     ICD-10-CM    1. Chest pain  R07.9       2. Leg cramping  R25.2       3. Headache  R51.9       4. Elevated blood pressure reading  R03.0       5. CLL (chronic lymphocytic leukemia) (H)  C91.10                DISPOSITION:   Discharged to home in able condition.     11/16/2023  [facility] EMERGENCY DEPT      Elaine Nix MD  11/22/23 [id number]

## 2023-11-17 NOTE — ED TRIAGE NOTES
"Arrives from home. States \"we will see what that says\" when asked why they are here.   States concerns for blood pressure. States anxious feeling which comes and goes.   States this prompted her to check her blood pressure and it was 172/95.           "

## 2023-11-17 NOTE — ED PROVIDER NOTES
History     Chief Complaint:  Hypertension       HPI   Kassidy Guajardo is a 63 year old female with a history of CLL currently being monitored by [facility] presenting today for evaluation of high blood pressure and chest pressure.  Yesterday morning, she woke up with central chest pressure that was quite severe.  This was resolved after eating breakfast.  Since then, she has had constant waxing and waning chest pressure.  She denies any associated symptoms including fevers, cough, shortness of breath.  She has been having intermittent leg spasms and cramping for the last 3 weeks that feels similar to a charley horse but are migrating and more severe.    Additionally, the patient has concerns regarding her blood pressure.  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  She went to the school nurse and had her blood pressure checked and it was elevated at 170s/100s.  This occurred 3 times in a row so she came here for further evaluation.  She did have a mild headache today but no vision changes, neck pain or, or speech difficulty.    The patient reports significant increase in stress recently.  She works several jobs and is currently doing her annual testing for her job.  She questions if this is contributing to her symptoms today.  She does report a significant family history of hypertension with her mother having blood pressures in the 200s/180s despite medication.    Independent Historian:   Spouse/Partner - They report the patient's chest pressure was very severe today.    Review of External Notes:   I reviewed family medicine visit from 5/16/23.  Patient was having elevated blood pressures at that time 150s/90s at home.  Decision was made to pursue lifestyle modifications for management and hold off on antihypertensives at that time.    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       Past Surgical History:    Past Surgical History:   Procedure Laterality Date    COLONOSCOPY      GI SURGERY      HERNIA REPAIR Right     herniated intestine right        Physical Exam   Patient Vitals for the past 24 hrs:   BP Temp Temp src Pulse Resp SpO2   11/16/23 2212 -- -- -- 98 18 96 %   11/16/23 2202 (!) 152/85 -- -- 102 19 95 %   11/16/23 2152 -- -- -- 99 23 97 %   11/16/23 2142 (!) 147/86 -- -- 99 27 96 %   11/16/23 2132 (!) 146/74 -- -- 96 15 95 %   11/16/23 1854 (!) 176/107 98.5  F (36.9  C) Temporal 88 18 97 %        Physical Exam  BP (!) 152/85   Pulse 98   Temp 98.5  F (36.9  C) (Temporal)   Resp 18   SpO2 96%    General: In a recumbent position on gurney.  Accompanied by .  Examined in ED10.  Head: Atraumatic. Normocephalic.  EENT: PERRL. EOMI. Moist mucus membranes.   CV: Regular rate and rhythm. No appreciable murmurs, rubs, or gallops.  No reproducible chest wall tenderness.  Respiratory: Breathing comfortably on room air. Lungs clear to auscultation bilaterally without wheezes, rhonchi, or rales.  GI: Soft, non-distended. Non-tender abdomen. No rebound, rigidity, or guarding.   Msk: Extremities without tenderness to palpation or deformity.  No lower extremity swelling.  No calf tenderness to palpation.  Skin: Warm and dry. No rashes.  Neuro: Awake, alert, and conversant. No focal neurologic deficits.   Psych: Appropriate mood and affect.    Emergency Department Course   ECG  ECG results from 11/16/23   EKG 12 lead     Value    Systolic Blood Pressure     Diastolic Blood Pressure     Ventricular Rate 93    Atrial Rate 93    MS Interval 164    QRS Duration 84        QTc 442    P Axis 49    R AXIS 23    T Axis 32    Interpretation ECG      Sinus rhythm  Normal ECG  No previous ECGs available       Imaging:  Chest XR,  PA & LAT   Final Result   IMPRESSION: Negative  chest.         Report per radiology    Laboratory:  Labs Ordered and Resulted from Time of ED Arrival to Time of ED Departure   CBC WITH PLATELETS AND DIFFERENTIAL - Abnormal       Result Value    WBC Count 111.7 (*)     RBC Count 4.59      Hemoglobin 12.0      Hematocrit 40.7      MCV 89      MCH 26.1 (*)     MCHC 29.5 (*)     RDW 16.9 (*)     Platelet Count 141 (*)     % Neutrophils        % Lymphocytes        % Monocytes        % Eosinophils        % Basophils        % Immature Granulocytes        NRBCs per 100 WBC 0      Absolute Neutrophils        Absolute Lymphocytes        Absolute Monocytes        Absolute Eosinophils        Absolute Basophils        Absolute Immature Granulocytes        Absolute NRBCs 0.0     DIFFERENTIAL - Abnormal    % Neutrophils 7      % Lymphocytes 90      % Monocytes 3      % Eosinophils 0      % Basophils 0      Absolute Neutrophils 7.8      Absolute Lymphocytes 100.5 (*)     Absolute Monocytes 3.4 (*)     Absolute Eosinophils 0.0      Absolute Basophils 0.0      RBC Morphology Confirmed RBC Indices      Platelet Assessment        Value: Automated Count Confirmed. Platelet morphology is normal.    Reactive Lymphocytes Present (*)     Smudge Cells Present (*)    TROPONIN T, HIGH SENSITIVITY - Abnormal    Troponin T, High Sensitivity 31 (*)    POTASSIUM - Normal    Potassium 4.0     D DIMER QUANTITATIVE - Normal    D-Dimer Quantitative <0.27     TROPONIN T, HIGH SENSITIVITY - Normal    Troponin T, High Sensitivity 10     COMPREHENSIVE METABOLIC PANEL    Sodium 139      Potassium        Carbon Dioxide (CO2) 27      Anion Gap 7      Urea Nitrogen 19.6      Creatinine 0.79      GFR Estimate 83      Calcium 9.2      Chloride 105      Glucose 98      Alkaline Phosphatase 57      AST 17      ALT 14      Protein Total 6.7      Albumin 4.7      Bilirubin Total 0.5        Emergency Department Course & Assessments:  Interventions:  Medications - No data to display     Assessments and  Consultations:  Patient was seen in conjunction with attending physician Dr. Nix.       I performed my initial evaluation of the patient  ED Course as of 23 2342   Thu  Pt and  updated on results and plan    Spoke with patient regarding results and plan. I discussed the plan with the patient and her . All questions were answered and they were in agreement the plan. We discussed signs and symptoms that should prompt immediate reevaluation, and they vocalized understanding. Patient is safe for discharge to home.      Independent Interpretation (X-rays, CTs, rhythm strip):  Chest x-ray without any focal consolidations, pneumothorax, pleural effusions. Normal cardiac silhouette.  No widening of the mediastinum.    Social Determinants of Health affecting care:   Reports financial struggles.    Disposition:  The patient was discharged to home.     Impression & Plan    Medical Decision Makin-year-old female coming here with several concerns.    Initial concern mentioned was high blood pressure.  Initial blood pressure was 176/107.  This did come down while she was here.  Most recent blood pressure is 152/85.  Lab work obtained to look for signs of endorgan damage.  Kidney function is normal.  Patient had a mild headache earlier that has since resolved and she did not have any associated vision changes.  I do not think this represents an intracranial hemorrhage.  With her high blood pressure on several occasions and family history, she would likely benefit from an antihypertensive.  However given that her blood pressure here is not emergently elevated, will defer this discussion and decision making to the patient's primary care provider whom I have encouraged her to see within the next 2 weeks.    She also mentioned chest pressure since yesterday morning.  Differential included ACS, PE, pneumothorax, aortic dissection, GERD, myocarditis, pericarditis.  Initial  troponin was elevated at 31, however 2 hours later had decreased to 10, doubt this represents ACS.  D-dimer was within normal limits, doubt PE.  Obtained a chest x-ray with no acute findings.  Etiology of his chest pain is unclear at this time, however I think the patient is relatively low risk and can be worked up further in the outpatient setting.    She also mentioned bilateral leg cramping that is intermittent.  Is not having any currently.  This has been going on over the last couple of months.  I do not think her symptoms represent a DVT that is migrating.  She does not have any appreciable leg swelling today.  She is not had any recent falls or injuries to suggest fractures.  Her electrolytes are normal, so doubt electrolyte related cramping.  She does spend most of the day on her feet so I have recommended she try wearing compression stockings to see if that is helpful for her.    I discussed the plan with the patient and her . All questions were answered and they were in agreement the plan. We discussed signs and symptoms that should prompt immediate reevaluation, and they vocalized understanding. Patient is safe for discharge to home.     Diagnosis:    ICD-10-CM    1. Chest pain  R07.9       2. Leg cramping  R25.2       3. Headache  R51.9       4. Elevated blood pressure reading  R03.0       5. CLL (chronic lymphocytic leukemia) (H)  C91.10            Salud Fried PA-C  November 16, 2023   [facility]           Salud Fried PA-C  11/16/23 1390     The patient is a 79y Male complaining of lower leg pain/injury.

## 2024-02-06 ENCOUNTER — THERAPY VISIT (OUTPATIENT)
Dept: PHYSICAL THERAPY | Facility: CLINIC | Age: 64
End: 2024-02-06
Attending: PSYCHIATRY & NEUROLOGY
Payer: COMMERCIAL

## 2024-02-06 DIAGNOSIS — G24.4 OROMANDIBULAR DYSTONIA: ICD-10-CM

## 2024-02-06 DIAGNOSIS — M26.609 TMJ DYSFUNCTION: Primary | ICD-10-CM

## 2024-02-06 PROCEDURE — 97161 PT EVAL LOW COMPLEX 20 MIN: CPT | Mod: GP | Performed by: PHYSICAL THERAPIST

## 2024-02-06 PROCEDURE — 97110 THERAPEUTIC EXERCISES: CPT | Mod: GP | Performed by: PHYSICAL THERAPIST

## 2024-02-06 PROCEDURE — 97530 THERAPEUTIC ACTIVITIES: CPT | Mod: GP | Performed by: PHYSICAL THERAPIST

## 2024-02-10 PROBLEM — M26.609 TMJ DYSFUNCTION: Status: ACTIVE | Noted: 2024-02-10

## 2024-02-10 NOTE — PROGRESS NOTES
PHYSICAL THERAPY EVALUATION  Type of Visit: Evaluation    See electronic medical record for Abuse and Falls Screening details.    Subjective       Presenting condition or subjective complaint: Patient has had oromandibular dystonia since 2020. She has been treated with botox injections for several years now which help. Patient's main problem is closing her mouth after opening making eating and chewing food difficult.   Date of onset:  (2020)    Relevant medical history:    Past Medical History:   Diagnosis Date    CLL (chronic lymphocytic leukemia) (H)     Oromandibular dystonia 3/23/2020        Dates & types of surgery:    Past Surgical History:   Procedure Laterality Date    COLONOSCOPY      GI SURGERY      HERNIA REPAIR Right     herniated intestine right         Prior diagnostic imaging/testing results:       Prior therapy history for the same diagnosis, illness or injury: Yes has had PT through [facility]; botox injections        Living Environment  Social support: With a significant other or spouse   Type of home: House   Stairs to enter the home: No       Ramp: No   Stairs inside the home: Yes 15 Is there a railing: Yes   Help at home: None  Equipment owned:       Employment: Yes    Hobbies/Interests: walks, family, baking    Patient goals for therapy: manage chewing, control opening and closing of mouth    Pain assessment: Pain denied     Objective   TMJ/TMD EVALUATION  ADDITIONAL HISTORY:  Parafunctional habits:  none      Headache: No reports of headaches  Exercise routine: walking daily  Symptoms reported:  dystonia    Joint lock: Joint does not lock    PAIN: Pain Level at Rest: 0/10, patient does not have pain  OBSERVATION/FACIAL SYMMETRY: symmetrical  POSITIONING:  normal    POSTURE: Forward head   INTRAORAL MOUTH APPLIANCE: No  CERVICAL ROM: AROM WNL  TMJ ROM:  opening 50 mm; lateral trusion R 10 mm; L 8 mm ; due to dystonia patient has difficulty closing mouth after opening.  STRENGTH:     Left  Right   Mid Trap 5/5 5/5   Lower Trap 5/5 5/5   Rhomboid 5/5 5/5   SPECIAL TESTS:  n/t  PALPATION:  MF tightness in B masseter  JOINT MOBILITY/ACCESSORY MOTION: WNL          Assessment & Plan   CLINICAL IMPRESSIONS  Medical Diagnosis: orormandubular dystonia    Treatment Diagnosis: orormandubular dystonia   Impression/Assessment: Patient is a 63 year old female with jaw dystonia complaints.  The following significant findings have been identified:  involuntary movements of jaw . These impairments interfere with their ability to perform self care tasks/eating as compared to previous level of function.     Clinical Decision Making (Complexity):  Clinical Presentation: Stable/Uncomplicated  Clinical Presentation Rationale: based on medical and personal factors listed in PT evaluation  Clinical Decision Making (Complexity): Low complexity    PLAN OF CARE  Treatment Interventions:  Interventions: Therapeutic Activity, Therapeutic Exercise, Self-Care/Home Management    Long Term Goals     PT Goal 1  Goal Identifier: HEP  Goal Description: independence with a HEP  Rationale: to maximize safety and independence with self cares;to maximize safety and independence with performance of ADLs and functional tasks  Target Date: 02/13/24  Date Met: 02/06/24      Frequency of Treatment: 1 x week  Duration of Treatment: 1 visit      Education Assessment:   Learner/Method: Patient;Listening;Demonstration    Risks and benefits of evaluation/treatment have been explained.   Patient/Family/caregiver agrees with Plan of Care.     Evaluation Time:     PT Eval, Low Complexity Minutes (73633): 15       Signing Clinician: Andra Steele, PT      [facility]                                                                                   OUTPATIENT PHYSICAL THERAPY      PLAN OF TREATMENT FOR OUTPATIENT REHABILITATION   Patient's Last Name, First Name, Kassidy Shaikh YOB: 1960    Provider's Name   [facility]   Medical Record No.  [id number]     Onset Date:  (2020)  Start of Care Date:       Medical Diagnosis:  orormandubular dystonia      PT Treatment Diagnosis:  orormandubular dystonia Plan of Treatment  Frequency/Duration: 1 x week/ 1 visit    Certification date from 2/6/24  to 2/13/24           See note for plan of treatment details and functional goals     Andra Steele, PT                         I CERTIFY THE NEED FOR THESE SERVICES FURNISHED UNDER        THIS PLAN OF TREATMENT AND WHILE UNDER MY CARE     (Physician attestation of this document indicates review and certification of the therapy plan).              Referring Provider:  Lauren Pierre    Initial Assessment  See Epic Evaluation-                   n/a

## 2024-04-01 ENCOUNTER — TELEPHONE (OUTPATIENT)
Dept: NEUROLOGY | Facility: CLINIC | Age: 64
End: 2024-04-01
Payer: COMMERCIAL

## 2024-04-01 DIAGNOSIS — G24.4 OROMANDIBULAR DYSTONIA: Primary | ICD-10-CM

## 2024-04-01 NOTE — TELEPHONE ENCOUNTER
Dr. Pierre,    Can you please put in a new order for Botox? Her Botox order will  on . Thank you.      SAGE Hernandez on 2024 at 11:24 AM

## 2024-04-19 ENCOUNTER — OFFICE VISIT (OUTPATIENT)
Dept: NEUROLOGY | Facility: CLINIC | Age: 64
End: 2024-04-19
Payer: COMMERCIAL

## 2024-04-19 VITALS — SYSTOLIC BLOOD PRESSURE: 140 MMHG | HEART RATE: 74 BPM | DIASTOLIC BLOOD PRESSURE: 90 MMHG

## 2024-04-19 DIAGNOSIS — G24.4 OROMANDIBULAR DYSTONIA: Primary | ICD-10-CM

## 2024-04-19 PROCEDURE — 64612 DESTROY NERVE FACE MUSCLE: CPT | Mod: 50 | Performed by: PSYCHIATRY & NEUROLOGY

## 2024-04-19 PROCEDURE — 95874 GUIDE NERV DESTR NEEDLE EMG: CPT | Performed by: PSYCHIATRY & NEUROLOGY

## 2024-04-19 NOTE — NURSING NOTE
Chief Complaint   Patient presents with    Botox       SAGE Hernandez on 4/19/2024 at 11:31 AM     (3) slightly limited

## 2024-04-19 NOTE — PROGRESS NOTES
Movement Disorders Botulinum Toxin Clinic Note    History of Present Illness:  Kassidy Guajardo is a 63 year old female who presents to clinic for botulinum toxin injections for treatment of Idiopathic oromandibular dystonia with jaw opening.  Neurotoxin injections are first line gold standard treatment for focal dystonia.     Date of last injection: 23  Due to insurance issues she was not able to receive her scheduled neurotoxin injections and is delayed 6 months from last set of injections.     Onset of effect after injections:  1.5 weeks    Response from last injections:  Reported improvement in tightness in muscles in jaw opening.   Improvement in function/activity: unable to recall     Wearing off: 2024    Side effects: none    Additional interval history: She saw [facility] on oromandibular dystonia which was helpful. She has a lot of teeth problems due to the dystonia.     Patient denies new medical diagnoses, illnesses, hospitalizations, emergency room visits, and injuries since the previous injection with botulinum neurotoxin.    Physical Examination:  Vital Signs:   blood pressure is 140/90 (abnormal) and her pulse is 74.   Difficulty closing jaw completely      BOTULINUM NEUROTOXIN INJECTION PROCEDURES:    VERIFICATION OF PATIENT IDENTIFICATION AND PROCEDURE     Initials   Patient Name kd   Patient  kd   Procedure Verified by: merlin     Prior to the start of the procedure and with procedural staff participation, I verbally confirmed the patient s identity using two indicators, relevant allergies, that the procedure was appropriate and matched the consent or emergent situation, and that the correct equipment/implants were available. Immediately prior to starting the procedure I conducted the Time Out with the procedural staff and re-confirmed the patient s name, procedure, and site/side. (The Joint Commission universal protocol was followed.)  Yes    Sedation (Moderate or Deep):  None      Above assessments performed by:    Provider: Lauren Pierre DO  Movement Disorders Specialist  [facility]       INDICATION/S FOR PROCEDURE/S:  Kassidy Guajardo is a 63 year old patient with dystonia affecting the  jaw muscles secondary to a diagnosis of oromandibular dystonia with associated  loss of joint motion, loss of volitional motor control, and difficulty with chewing .      Her baseline symptoms have been recalcitrant to oral medications and conservative therapy.  She is here today for an injection of Botox.       GOAL OF PROCEDURE:  The goal of this procedure is to improve volitional motor control and improve chewing and closing jaw associated with dystonic movements.     TOTAL DOSE ADMINISTERED:  Dose Administer: 80 units Botox    Diluent Used:  Preservative Free Normal Saline  Total Volume of Diluent Used:  1 mL/100 units     CONSENT:  The risks, benefits, and treatment options were discussed with Kassidy Guajardo and she agreed to proceed.      Written consent was obtained by kd.      EQUIPMENT USED:  Needle-25mm stimulating/recording  EMG/NCS Machine     SKIN PREPARATION:  Skin preparation was performed using an alcohol wipe.     GUIDANCE DESCRIPTION:  Electro-myographic guidance was necessary throughout the procedure to accurately identify all areas of dystonic muscles while avoiding injection of non-dystonic muscles, neighboring nerves and nearby vascular structures.      AREA/MUSCLE INJECTED:    Muscles Injected Units Injected Number of Injections   Left lateral pterygoid 30 (25) 1   Left digastric, anterior belly 10 (10) 1           Right lateral pterygoid 30 (25) 1   Right digastric, anterior belly 10 (10) 1           Total Units Injected: 80     Unavoidable Waste: 20     Total Units Billed 100       ( ) = previous dose    The patient tolerated the injections without difficulty.    Assessment:    Kassidy Guajardo is a 63 year old female with  Idiopathic oromandibular  dystonia with jaw opening.  Today we did repeat botulinum toxin injections.    Plan  Message via [employer] or call the clinic in 4-6 weeks for an update  Avoid heat and cold pack and massaging the areas injected for 24 hours post injections  Follow-up in 3 and 6 months to consider repeat injections

## 2024-04-19 NOTE — LETTER
2024         RE: Kassidy Guajardo  [address] [facility] [address]        Dear Colleague,    Thank you for referring your patient, Kassidy Guajardo, to the [facility]. Please see a copy of my visit note below.    Movement Disorders Botulinum Toxin Clinic Note    History of Present Illness:  Kassidy Guajardo is a 63 year old female who presents to clinic for botulinum toxin injections for treatment of Idiopathic oromandibular dystonia with jaw opening.  Neurotoxin injections are first line gold standard treatment for focal dystonia.     Date of last injection: 23  Due to insurance issues she was not able to receive her scheduled neurotoxin injections and is delayed 6 months from last set of injections.     Onset of effect after injections:  1.5 weeks    Response from last injections:  Reported improvement in tightness in muscles in jaw opening.   Improvement in function/activity: unable to recall     Wearing off: 2024    Side effects: none    Additional interval history: She saw [facility] for [facility] Therapy on oromandibular dystonia which was helpful. She has a lot of teeth problems due to the dystonia.     Patient denies new medical diagnoses, illnesses, hospitalizations, emergency room visits, and injuries since the previous injection with botulinum neurotoxin.    Physical Examination:  Vital Signs:   blood pressure is 140/90 (abnormal) and her pulse is 74.   Difficulty closing jaw completely      BOTULINUM NEUROTOXIN INJECTION PROCEDURES:    VERIFICATION OF PATIENT IDENTIFICATION AND PROCEDURE     Initials   Patient Name kd   Patient  kd   Procedure Verified by: merlin     Prior to the start of the procedure and with procedural staff participation, I verbally confirmed the patient s identity using two indicators, relevant allergies, that the procedure was appropriate and matched the consent or emergent situation, and that the correct  equipment/implants were available. Immediately prior to starting the procedure I conducted the Time Out with the procedural staff and re-confirmed the patient s name, procedure, and site/side. (The Joint Commission universal protocol was followed.)  Yes    Sedation (Moderate or Deep): None      Above assessments performed by:    Provider: Lauren Pierre DO  Movement Disorders Specialist  [facility]       INDICATION/S FOR PROCEDURE/S:  Kassidy Guajardo is a 63 year old patient with dystonia affecting the  jaw muscles secondary to a diagnosis of oromandibular dystonia with associated  loss of joint motion, loss of volitional motor control, and difficulty with chewing .      Her baseline symptoms have been recalcitrant to oral medications and conservative therapy.  She is here today for an injection of Botox.       GOAL OF PROCEDURE:  The goal of this procedure is to improve volitional motor control and improve chewing and closing jaw associated with dystonic movements.     TOTAL DOSE ADMINISTERED:  Dose Administer: 80 units Botox    Diluent Used:  Preservative Free Normal Saline  Total Volume of Diluent Used:  1 mL/100 units     CONSENT:  The risks, benefits, and treatment options were discussed with Kassidy Guajardo and she agreed to proceed.      Written consent was obtained by kd.      EQUIPMENT USED:  Needle-25mm stimulating/recording  EMG/NCS Machine     SKIN PREPARATION:  Skin preparation was performed using an alcohol wipe.     GUIDANCE DESCRIPTION:  Electro-myographic guidance was necessary throughout the procedure to accurately identify all areas of dystonic muscles while avoiding injection of non-dystonic muscles, neighboring nerves and nearby vascular structures.      AREA/MUSCLE INJECTED:    Muscles Injected Units Injected Number of Injections   Left lateral pterygoid 30 (25) 1   Left digastric, anterior belly 10 (10) 1           Right lateral pterygoid 30 (25) 1   Right digastric,  anterior belly 10 (10) 1           Total Units Injected: 80     Unavoidable Waste: 20     Total Units Billed 100       ( ) = previous dose    The patient tolerated the injections without difficulty.    Assessment:    Kassidy Guajardo is a 63 year old female with  Idiopathic oromandibular dystonia with jaw opening.  Today we did repeat botulinum toxin injections.    Plan  Message via [employer] or call the clinic in 4-6 weeks for an update  Avoid heat and cold pack and massaging the areas injected for 24 hours post injections  Follow-up in 3 and 6 months to consider repeat injections              Again, thank you for allowing me to participate in the care of your patient.        Sincerely,        Lauren Pierre DO

## 2024-04-29 PROBLEM — M26.609 TMJ DYSFUNCTION: Status: RESOLVED | Noted: 2024-02-10 | Resolved: 2024-04-29

## 2024-05-16 ENCOUNTER — APPOINTMENT (OUTPATIENT)
Dept: URBAN - METROPOLITAN AREA CLINIC 253 | Age: 64
Setting detail: DERMATOLOGY
End: 2024-05-28

## 2024-05-16 VITALS — RESPIRATION RATE: 14 BRPM | WEIGHT: 160 LBS | HEIGHT: 67 IN

## 2024-05-16 DIAGNOSIS — L84 CORNS AND CALLOSITIES: ICD-10-CM

## 2024-05-16 DIAGNOSIS — L81.4 OTHER MELANIN HYPERPIGMENTATION: ICD-10-CM

## 2024-05-16 DIAGNOSIS — L72.0 EPIDERMAL CYST: ICD-10-CM

## 2024-05-16 DIAGNOSIS — D49.2 NEOPLASM OF UNSPECIFIED BEHAVIOR OF BONE, SOFT TISSUE, AND SKIN: ICD-10-CM

## 2024-05-16 DIAGNOSIS — D22 MELANOCYTIC NEVI: ICD-10-CM

## 2024-05-16 DIAGNOSIS — D18.0 HEMANGIOMA: ICD-10-CM

## 2024-05-16 DIAGNOSIS — Z71.89 OTHER SPECIFIED COUNSELING: ICD-10-CM

## 2024-05-16 DIAGNOSIS — L82.1 OTHER SEBORRHEIC KERATOSIS: ICD-10-CM

## 2024-05-16 PROBLEM — D22.5 MELANOCYTIC NEVI OF TRUNK: Status: ACTIVE | Noted: 2024-05-16

## 2024-05-16 PROBLEM — D22.4 MELANOCYTIC NEVI OF SCALP AND NECK: Status: ACTIVE | Noted: 2024-05-16

## 2024-05-16 PROBLEM — D18.01 HEMANGIOMA OF SKIN AND SUBCUTANEOUS TISSUE: Status: ACTIVE | Noted: 2024-05-16

## 2024-05-16 PROCEDURE — 99213 OFFICE O/P EST LOW 20 MIN: CPT | Mod: 25

## 2024-05-16 PROCEDURE — 11102 TANGNTL BX SKIN SINGLE LES: CPT

## 2024-05-16 PROCEDURE — OTHER ADDITIONAL NOTES: OTHER

## 2024-05-16 PROCEDURE — OTHER COUNSELING: OTHER

## 2024-05-16 PROCEDURE — OTHER MIPS QUALITY: OTHER

## 2024-05-16 PROCEDURE — OTHER BIOPSY BY SHAVE METHOD: OTHER

## 2024-05-16 ASSESSMENT — LOCATION DETAILED DESCRIPTION DERM
LOCATION DETAILED: INFERIOR THORACIC SPINE
LOCATION DETAILED: LEFT INFERIOR LATERAL NECK
LOCATION DETAILED: LEFT INFERIOR LATERAL UPPER BACK
LOCATION DETAILED: LEFT PLANTAR FOREFOOT OVERLYING 5TH METATARSAL
LOCATION DETAILED: SUPERIOR LUMBAR SPINE
LOCATION DETAILED: RIGHT INFERIOR LATERAL NECK

## 2024-05-16 ASSESSMENT — LOCATION ZONE DERM
LOCATION ZONE: TRUNK
LOCATION ZONE: FEET
LOCATION ZONE: NECK

## 2024-05-16 ASSESSMENT — LOCATION SIMPLE DESCRIPTION DERM
LOCATION SIMPLE: UPPER BACK
LOCATION SIMPLE: LOWER BACK
LOCATION SIMPLE: LEFT PLANTAR SURFACE
LOCATION SIMPLE: LEFT ANTERIOR NECK
LOCATION SIMPLE: LEFT UPPER BACK
LOCATION SIMPLE: RIGHT ANTERIOR NECK

## 2024-05-16 NOTE — PROCEDURE: ADDITIONAL NOTES
Additional Notes: Discussed the option of shave removal. Provided billing codes for insurance coverage.
Detail Level: Zone
Render Risk Assessment In Note?: no
Additional Notes: Discussed making 30 minute excision. Provided billing codes for insurance coverage.
Additional Notes: Discussed using a pumice stone and wearing shoe inserts.

## 2024-05-16 NOTE — HPI: FULL BODY SKIN EXAMINATION
What Type Of Note Output Would You Prefer (Optional)?: Standard Output
What Is The Reason For Today's Visit?: Full Body Skin Examination
What Is The Reason For Today's Visit? (Being Monitored For X): concerning skin lesions on an annual basis
Additional History: She has a lesion on the left side of her neck that has been present for years but catches on necklaces and rubbing on clothing. This fills with pus and drains with pressure.

## 2024-05-16 NOTE — PROCEDURE: BIOPSY BY SHAVE METHOD
Detail Level: Detailed
Depth Of Biopsy: dermis
Was A Bandage Applied: Yes
Size Of Lesion In Cm: 0
Biopsy Type: H and E
Biopsy Method: Dermablade
Anesthesia Type: 1% lidocaine with epinephrine
Anesthesia Volume In Cc: 0.5
Hemostasis: Drysol
Wound Care: Petrolatum
Dressing: bandage
Destruction After The Procedure: No
Type Of Destruction Used: Curettage
Curettage Text: The wound bed was treated with curettage after the biopsy was performed.
Cryotherapy Text: The wound bed was treated with cryotherapy after the biopsy was performed.
Electrodesiccation Text: The wound bed was treated with electrodesiccation after the biopsy was performed.
Electrodesiccation And Curettage Text: The wound bed was treated with electrodesiccation and curettage after the biopsy was performed.
Silver Nitrate Text: The wound bed was treated with silver nitrate after the biopsy was performed.
Lab: -[id number]
Consent: Written consent was obtained and risks were reviewed including but not limited to scarring, infection, bleeding, scabbing, incomplete removal, nerve damage and allergy to anesthesia.
Post-Care Instructions: I reviewed with the patient in detail post-care instructions. Patient is to keep the biopsy site dry overnight, and then apply bacitracin twice daily until healed. Patient may apply hydrogen peroxide soaks to remove any crusting.
Notification Instructions: Patient will be notified of biopsy results. However, patient instructed to call the office if not contacted within 2 weeks.
Billing Type: Third-Party Bill
Information: Selecting Yes will display possible errors in your note based on the variables you have selected. This validation is only offered as a suggestion for you. PLEASE NOTE THAT THE VALIDATION TEXT WILL BE REMOVED WHEN YOU FINALIZE YOUR NOTE. IF YOU WANT TO FAX A PRELIMINARY NOTE YOU WILL NEED TO TOGGLE THIS TO 'NO' IF YOU DO NOT WANT IT IN YOUR FAXED NOTE.

## 2024-07-25 ENCOUNTER — APPOINTMENT (OUTPATIENT)
Dept: URBAN - METROPOLITAN AREA CLINIC 253 | Age: 64
Setting detail: DERMATOLOGY
End: 2024-07-29

## 2024-07-25 VITALS — RESPIRATION RATE: 14 BRPM | HEIGHT: 67.5 IN | WEIGHT: 160 LBS

## 2024-07-25 DIAGNOSIS — D22 MELANOCYTIC NEVI: ICD-10-CM

## 2024-07-25 PROBLEM — D22.5 MELANOCYTIC NEVI OF TRUNK: Status: ACTIVE | Noted: 2024-07-25

## 2024-07-25 PROCEDURE — OTHER COUNSELING: OTHER

## 2024-07-25 PROCEDURE — 11300 SHAVE SKIN LESION 0.5 CM/<: CPT

## 2024-07-25 PROCEDURE — OTHER SHAVE REMOVAL: OTHER

## 2024-07-25 PROCEDURE — OTHER MIPS QUALITY: OTHER

## 2024-07-25 ASSESSMENT — LOCATION SIMPLE DESCRIPTION DERM: LOCATION SIMPLE: LEFT UPPER BACK

## 2024-07-25 ASSESSMENT — LOCATION DETAILED DESCRIPTION DERM: LOCATION DETAILED: LEFT INFERIOR LATERAL UPPER BACK

## 2024-07-25 ASSESSMENT — LOCATION ZONE DERM: LOCATION ZONE: TRUNK

## 2024-07-25 NOTE — PROCEDURE: SHAVE REMOVAL
Medical Necessity Information: It is in your best interest to select a reason for this procedure from the list below. All of these items fulfill various CMS LCD requirements except the new and changing color options.
Medical Necessity Clause: This procedure was medically necessary because the lesion that was treated was:
Lab: -[id number]
Lab Facility: [id number]
Detail Level: Detailed
Was A Bandage Applied: Yes
Size Of Lesion In Cm (Required): 0.2
Depth Of Shave: dermis
Biopsy Method: Dermablade
Anesthesia Type: 2% lidocaine with epinephrine and a 1:10 solution of 8.4% sodium bicarbonate
Hemostasis: Drysol
Wound Care: Petrolatum
Accession #: [id number]
Path Notes (To The Dermatopathologist): Please check margins
Render Path Notes In Note?: No
Consent was obtained from the patient. The risks and benefits to therapy were discussed in detail. Specifically, the risks of infection, scarring, bleeding, prolonged wound healing, incomplete removal, allergy to anesthesia, nerve injury and recurrence were addressed. Prior to the procedure, the treatment site was clearly identified and confirmed by the patient. All components of Universal Protocol/PAUSE Rule completed.
Post-Care Instructions: I reviewed with the patient in detail post-care instructions. Patient is to keep the biopsy site dry overnight, and then apply bacitracin twice daily until healed. Patient may apply hydrogen peroxide soaks to remove any crusting.
Notification Instructions: Patient will be notified of pathology results. However, patient instructed to call the office if not contacted within 2 weeks.
Billing Type: Third-Party Bill

## 2024-07-26 ENCOUNTER — OFFICE VISIT (OUTPATIENT)
Dept: NEUROLOGY | Facility: CLINIC | Age: 64
End: 2024-07-26
Payer: COMMERCIAL

## 2024-07-26 VITALS — DIASTOLIC BLOOD PRESSURE: 81 MMHG | SYSTOLIC BLOOD PRESSURE: 134 MMHG | HEART RATE: 66 BPM

## 2024-07-26 DIAGNOSIS — G24.4 OROMANDIBULAR DYSTONIA: Primary | ICD-10-CM

## 2024-07-26 PROCEDURE — 64612 DESTROY NERVE FACE MUSCLE: CPT | Mod: 50 | Performed by: PSYCHIATRY & NEUROLOGY

## 2024-07-26 PROCEDURE — 95874 GUIDE NERV DESTR NEEDLE EMG: CPT | Performed by: PSYCHIATRY & NEUROLOGY

## 2024-07-26 NOTE — PROGRESS NOTES
"Movement Disorders Botulinum Toxin Clinic Note    History of Present Illness:  Kassidy Guajardo is a 64 year old female who presents to clinic for botulinum toxin injections for treatment of Idiopathic oromandibular dystonia with jaw opening.  Neurotoxin injections are first line gold standard treatment for focal dystonia.     Date of last injection: 24    Onset of effect after injections:  1 week    Response from last injections:  Reported improvement in tightness in muscles in jaw opening   Improvement in function/activity: Patient states she is able to eat and speak better.    Wearing off: hasn't noticed    Side effects: none    Additional interval history:   She has concerns over her memory and feels like she is thinking slowed and as if when she is \"drink\" Describes she chooses words not in the right context in conversation. Forgets names. She forgets her to do list. Gets 7 hours of sleep a night.   She had deep cleaning last Friday and this caused bruising.   She follow with Dr. Ro at [facility]. She is getting kit to evaluate for Obstructive Sleep Apnea in 2024.   Got hearing aids in 2024.     Labs:  :   TSH 4.53  Iron panel with slightly low iron saturation, otherwise normal  Ferritin 174 elevated   TF within normal limits    CMP within normal limits, CBC with abnormal levels     Patient denies new medical diagnoses, illnesses, hospitalizations, emergency room visits, and injuries since the previous injection with botulinum neurotoxin.    Physical Examination:  Vital Signs:   blood pressure is 134/81 and her pulse is 66.   Difficulty closing jaw completely      BOTULINUM NEUROTOXIN INJECTION PROCEDURES:    VERIFICATION OF PATIENT IDENTIFICATION AND PROCEDURE     Initials   Patient Name kd   Patient  kd   Procedure Verified by: merlin     Prior to the start of the procedure and with procedural staff participation, I verbally confirmed the patient s identity using two indicators, relevant " allergies, that the procedure was appropriate and matched the consent or emergent situation, and that the correct equipment/implants were available. Immediately prior to starting the procedure I conducted the Time Out with the procedural staff and re-confirmed the patient s name, procedure, and site/side. (The Joint Commission universal protocol was followed.)  Yes    Sedation (Moderate or Deep): None      Above assessments performed by:    Provider: Lauren Pierre DO  Movement Disorders Specialist  [facility]       INDICATION/S FOR PROCEDURE/S:  Kassidy Guajardo is a 64 year old patient with jaw muscles secondary to a diagnosis of oromandibular dystonia with associated  loss of joint motion, loss of volitional motor control, and difficulty with chewing .      Her baseline symptoms have been recalcitrant to oral medications and conservative therapy.  She is here today for an injection of Botox.       GOAL OF PROCEDURE:  The goal of this procedure is to improve volitional motor control and improve chewing and closing jaw associated with dystonic movements.     TOTAL DOSE ADMINISTERED:  Dose Administer: 80 units Botox    Diluent Used:  Preservative Free Normal Saline  Total Volume of Diluent Used:  1 mL/100 units     CONSENT:  The risks, benefits, and treatment options were discussed with Kassidy Guajardo and she agreed to proceed.      Written consent was obtained by kd.      EQUIPMENT USED:  Needle-25mm stimulating/recording  EMG/NCS Machine     SKIN PREPARATION:  Skin preparation was performed using an alcohol wipe.     GUIDANCE DESCRIPTION:  Electro-myographic guidance was necessary throughout the procedure to accurately identify all areas of dystonic muscles while avoiding injection of non-dystonic muscles, neighboring nerves and nearby vascular structures.      AREA/MUSCLE INJECTED:    Muscles Injected Units Injected Number of Injections   Left lateral pterygoid 30 (25) 1   Left digastric,  anterior belly 10 (10) 1           Right lateral pterygoid 30 (25) 1   Right digastric, anterior belly 10 (10) 1           Total Units Injected: 80     Unavoidable Waste: 20     Total Units Billed 100       ( ) = previous dose    The patient tolerated the injections without difficulty.    Assessment:    Kassidy Guajardo is a 64 year old female with idiopathic oromandibular dystonia, jaw opening type.  Today we did repeat botulinum toxin injections.    Plan  Message via [employer] or call the clinic in 4-6 weeks for an update  Avoid heat and cold pack and massaging the areas injected for 24 hours post injections  Follow-up in 3 and 6 months to consider repeat injections  Continue to follow with appropriate specialists for management of CLL, possible Obstructive Sleep Apnea, thyroid abnormalities  Continue following with Dr. Ro for memory concerns. Agree with Neuropsych testing.

## 2024-07-26 NOTE — LETTER
"7/26/2024      Kassidy Guajardo  [address] [facility] [address]      Dear Colleague,    Thank you for referring your patient, Kassidy Guajardo, to the [facility]. Please see a copy of my visit note below.    Movement Disorders Botulinum Toxin Clinic Note    History of Present Illness:  Kassidy Guajardo is a 64 year old female who presents to clinic for botulinum toxin injections for treatment of Idiopathic oromandibular dystonia with jaw opening.  Neurotoxin injections are first line gold standard treatment for focal dystonia.     Date of last injection: 4/19/24    Onset of effect after injections:  1 week    Response from last injections:  Reported improvement in tightness in muscles in jaw opening   Improvement in function/activity: Patient states she is able to eat and speak better.    Wearing off: hasn't noticed    Side effects: none    Additional interval history:   She has concerns over her memory and feels like she is thinking slowed and as if when she is \"drink\" Describes she chooses words not in the right context in conversation. Forgets names. She forgets her to do list. Gets 7 hours of sleep a night.   She had deep cleaning last Friday and this caused bruising.   She follow with Dr. Ro at [facility]. She is getting kit to evaluate for Obstructive Sleep Apnea in 8/2024.   Got hearing aids in 4/2024.     Labs:  4/20224:   TSH 4.53  Iron panel with slightly low iron saturation, otherwise normal  Ferritin 174 elevated   TF within normal limits   11/20223 CMP within normal limits, CBC with abnormal levels     Patient denies new medical diagnoses, illnesses, hospitalizations, emergency room visits, and injuries since the previous injection with botulinum neurotoxin.    Physical Examination:  Vital Signs:   blood pressure is 134/81 and her pulse is 66.   Difficulty closing jaw completely      BOTULINUM NEUROTOXIN INJECTION PROCEDURES:    VERIFICATION OF PATIENT " IDENTIFICATION AND PROCEDURE     Initials   Patient Name kd   Patient  kd   Procedure Verified by: merlin     Prior to the start of the procedure and with procedural staff participation, I verbally confirmed the patient s identity using two indicators, relevant allergies, that the procedure was appropriate and matched the consent or emergent situation, and that the correct equipment/implants were available. Immediately prior to starting the procedure I conducted the Time Out with the procedural staff and re-confirmed the patient s name, procedure, and site/side. (The Joint Commission universal protocol was followed.)  Yes    Sedation (Moderate or Deep): None      Above assessments performed by:    Provider: Lauren Pierre DO  Movement Disorders Specialist  [facility]       INDICATION/S FOR PROCEDURE/S:  Kassidy Guajardo is a 64 year old patient with jaw muscles secondary to a diagnosis of oromandibular dystonia with associated  loss of joint motion, loss of volitional motor control, and difficulty with chewing .      Her baseline symptoms have been recalcitrant to oral medications and conservative therapy.  She is here today for an injection of Botox.       GOAL OF PROCEDURE:  The goal of this procedure is to improve volitional motor control and improve chewing and closing jaw associated with dystonic movements.     TOTAL DOSE ADMINISTERED:  Dose Administer: 80 units Botox    Diluent Used:  Preservative Free Normal Saline  Total Volume of Diluent Used:  1 mL/100 units     CONSENT:  The risks, benefits, and treatment options were discussed with Kassidy Guajardo and she agreed to proceed.      Written consent was obtained by merlin.      EQUIPMENT USED:  Needle-25mm stimulating/recording  EMG/NCS Machine     SKIN PREPARATION:  Skin preparation was performed using an alcohol wipe.     GUIDANCE DESCRIPTION:  Electro-myographic guidance was necessary throughout the procedure to accurately identify all  areas of dystonic muscles while avoiding injection of non-dystonic muscles, neighboring nerves and nearby vascular structures.      AREA/MUSCLE INJECTED:    Muscles Injected Units Injected Number of Injections   Left lateral pterygoid 30 (25) 1   Left digastric, anterior belly 10 (10) 1           Right lateral pterygoid 30 (25) 1   Right digastric, anterior belly 10 (10) 1           Total Units Injected: 80     Unavoidable Waste: 20     Total Units Billed 100       ( ) = previous dose    The patient tolerated the injections without difficulty.    Assessment:    Kassidy Guajardo is a 64 year old female with idiopathic oromandibular dystonia, jaw opening type.  Today we did repeat botulinum toxin injections.    Plan  Message via [employer] or call the clinic in 4-6 weeks for an update  Avoid heat and cold pack and massaging the areas injected for 24 hours post injections  Follow-up in 3 and 6 months to consider repeat injections  Continue to follow with appropriate specialists for management of CLL, possible Obstructive Sleep Apnea, thyroid abnormalities  Continue following with Dr. Ro for memory concerns. Agree with Neuropsych testing.                Again, thank you for allowing me to participate in the care of your patient.        Sincerely,        Lauren Pierre DO

## 2024-09-19 ENCOUNTER — HOSPITAL ENCOUNTER (OUTPATIENT)
Dept: ULTRASOUND IMAGING | Facility: CLINIC | Age: 64
Discharge: HOME | End: 2024-09-19
Attending: PHYSICIAN ASSISTANT | Admitting: PHYSICIAN ASSISTANT
Payer: COMMERCIAL

## 2024-09-19 DIAGNOSIS — E04.1 THYROID NODULE: ICD-10-CM

## 2024-09-19 DIAGNOSIS — R93.89 ABNORMAL ULTRASOUND OF THYROID GLAND: ICD-10-CM

## 2024-09-19 PROCEDURE — 10005 FNA BX W/US GDN 1ST LES: CPT

## 2024-09-19 PROCEDURE — 88173 CYTOPATH EVAL FNA REPORT: CPT | Mod: 26 | Performed by: PATHOLOGY

## 2024-09-19 PROCEDURE — 88173 CYTOPATH EVAL FNA REPORT: CPT | Mod: TC | Performed by: PHYSICIAN ASSISTANT

## 2024-09-19 PROCEDURE — 272N000431 US BIOPSY THYROID FINE NEEDLE ASPIRATION

## 2024-09-19 PROCEDURE — 250N000009 HC RX 250: Performed by: RADIOLOGY

## 2024-09-19 RX ADMIN — LIDOCAINE HYDROCHLORIDE 10 ML: 10 INJECTION, SOLUTION EPIDURAL; INFILTRATION; INTRACAUDAL; PERINEURAL at 13:27

## 2024-09-19 NOTE — PROGRESS NOTES
Assisted Dr.Dr. Feliz in thyroid FNA biopsy of pt's Left lower pole   Size: 2 x 2 x 3.4cm. Pt tolerated procedure well with lidocaine per MAR. Applied bandaid, bleeding controlled. Pt give discharge instructions and questions answered. Pt left in stable condition. Specimen brought to lab for analysis.

## 2024-09-23 LAB
PATH REPORT.COMMENTS IMP SPEC: NORMAL
PATH REPORT.COMMENTS IMP SPEC: NORMAL
PATH REPORT.FINAL DX SPEC: NORMAL
PATH REPORT.GROSS SPEC: NORMAL
PATH REPORT.MICROSCOPIC SPEC OTHER STN: NORMAL

## 2024-10-10 DIAGNOSIS — G24.4 OROMANDIBULAR DYSTONIA: Primary | ICD-10-CM

## 2024-10-23 NOTE — PROGRESS NOTES
Movement Disorders Botulinum Toxin Clinic Note    History of Present Illness:  Kassidy Guajardo is a 64 year old female who presents to clinic for botulinum toxin injections for treatment of Idiopathic oromandibular dystonia with jaw opening.  Neurotoxin injections are first line gold standard treatment for focal dystonia.     Date of last injection: 24    Onset of effect after injections:  2 weeks    Response from last injections:  Reported improvement in tightness in muscles in jaw opening   Improvement in function/activity: Patient states she is able to eat and speak better.    Wearing off: Slight difficultly chewing in the last 1 week    Side effects: Some difficultly with large bites of food    Additional interval history:   None    Patient denies new medical diagnoses, illnesses, hospitalizations, emergency room visits, and injuries since the previous injection with botulinum neurotoxin.    Physical Examination:  Vital Signs:   vitals were not taken for this visit.   Difficulty closing jaw completely      BOTULINUM NEUROTOXIN INJECTION PROCEDURES:    VERIFICATION OF PATIENT IDENTIFICATION AND PROCEDURE     Initials   Patient Name KG   Patient  KG   Procedure Verified by: KG     Prior to the start of the procedure and with procedural staff participation, I verbally confirmed the patient s identity using two indicators, relevant allergies, that the procedure was appropriate and matched the consent or emergent situation, and that the correct equipment/implants were available. Immediately prior to starting the procedure I conducted the Time Out with the procedural staff and re-confirmed the patient s name, procedure, and site/side. (The Joint Commission universal protocol was followed.)  Yes    Sedation (Moderate or Deep): None      Above assessments performed by:    Provider: Nasreen Guerra MD  Movement Disorders Specialist  [facility]     INDICATION/S FOR PROCEDURE/S:  Kassidy Guajardo is a  64 year old patient with jaw muscles secondary to a diagnosis of oromandibular dystonia with associated  loss of joint motion, loss of volitional motor control, and difficulty with chewing .      Her baseline symptoms have been recalcitrant to oral medications and conservative therapy.  She is here today for an injection of Botox.       GOAL OF PROCEDURE:  The goal of this procedure is to improve volitional motor control and improve chewing and closing jaw associated with dystonic movements.     TOTAL DOSE ADMINISTERED:  Dose Administer: 80 units Botox    Diluent Used:  Preservative Free Normal Saline  Total Volume of Diluent Used:  1 mL/100 units     CONSENT:  The risks, benefits, and treatment options were discussed with Kassidy Guajardo and she agreed to proceed.      Written consent was obtained by kg.      EQUIPMENT USED:  Needle-25mm stimulating/recording  EMG/NCS Machine     SKIN PREPARATION:  Skin preparation was performed using an alcohol wipe.     GUIDANCE DESCRIPTION:  Electro-myographic guidance was necessary throughout the procedure to accurately identify all areas of dystonic muscles while avoiding injection of non-dystonic muscles, neighboring nerves and nearby vascular structures.      AREA/MUSCLE INJECTED:    Muscles Injected Units Injected Number of Injections   Left lateral pterygoid 30 (30) 1   Left digastric, anterior belly 10 (10) 1           Right lateral pterygoid 30 (30) 1   Right digastric, anterior belly 10 (10) 1           Total Units Injected: 80     Unavoidable Waste: 20     Total Units Billed 100       ( ) = previous dose    The patient tolerated the injections without difficulty.    Assessment:    Kassidy Guajardo is a 64 year old female with idiopathic oromandibular dystonia, jaw opening type.  Today we did repeat botulinum toxin injections.    Plan  Message via [employer] or call the clinic in 4-6 weeks for an update  Avoid heat and cold pack and massaging the areas injected for 24 hours  post injections  Follow-up in 3 and 6 months to consider repeat injections    Patient seen and discussed with Dr. Mari Stark MD  Neuromodulation/Movement Disorders Fellow

## 2024-10-24 ENCOUNTER — OFFICE VISIT (OUTPATIENT)
Dept: NEUROLOGY | Facility: CLINIC | Age: 64
End: 2024-10-24
Payer: COMMERCIAL

## 2024-10-24 VITALS — DIASTOLIC BLOOD PRESSURE: 87 MMHG | SYSTOLIC BLOOD PRESSURE: 140 MMHG | HEART RATE: 64 BPM

## 2024-10-24 DIAGNOSIS — G24.4 OROMANDIBULAR DYSTONIA: Primary | ICD-10-CM

## 2024-10-24 PROCEDURE — 64612 DESTROY NERVE FACE MUSCLE: CPT | Mod: 50 | Performed by: PSYCHIATRY & NEUROLOGY

## 2024-10-24 PROCEDURE — 95874 GUIDE NERV DESTR NEEDLE EMG: CPT | Performed by: PSYCHIATRY & NEUROLOGY

## 2024-10-24 NOTE — LETTER
10/24/2024      Kassidy Guajardo  [address] [facility] [address]      Dear Colleague,    Thank you for referring your patient, Kassidy Guajardo, to the [facility] NEUROLOGY [facility]INIC [facility]. Please see a copy of my visit note below.    Movement Disorders Botulinum Toxin Clinic Note    History of Present Illness:  Kassidy Guajardo is a 64 year old female who presents to clinic for botulinum toxin injections for treatment of Idiopathic oromandibular dystonia with jaw opening.  Neurotoxin injections are first line gold standard treatment for focal dystonia.     Date of last injection: 24    Onset of effect after injections:  2 weeks    Response from last injections:  Reported improvement in tightness in muscles in jaw opening   Improvement in function/activity: Patient states she is able to eat and speak better.    Wearing off: Slight difficultly chewing in the last 1 week    Side effects: Some difficultly with large bites of food    Additional interval history:   None    Patient denies new medical diagnoses, illnesses, hospitalizations, emergency room visits, and injuries since the previous injection with botulinum neurotoxin.    Physical Examination:  Vital Signs:   vitals were not taken for this visit.   Difficulty closing jaw completely      BOTULINUM NEUROTOXIN INJECTION PROCEDURES:    VERIFICATION OF PATIENT IDENTIFICATION AND PROCEDURE     Initials   Patient Name KG   Patient  KG   Procedure Verified by: KG     Prior to the start of the procedure and with procedural staff participation, I verbally confirmed the patient s identity using two indicators, relevant allergies, that the procedure was appropriate and matched the consent or emergent situation, and that the correct equipment/implants were available. Immediately prior to starting the procedure I conducted the Time Out with the procedural staff and re-confirmed the patient s name, procedure, and site/side. (The [employer] universal protocol was followed.)  Yes    Sedation (Moderate or Deep): None      Above assessments performed by:    Provider: Nasreen Guerra MD  Movement Disorders Specialist  [facility] Neurology     INDICATION/S FOR PROCEDURE/S:  Kassidy Guajardo is a 64 year old patient with jaw muscles secondary to a diagnosis of oromandibular dystonia with associated  loss of joint motion, loss of volitional motor control, and difficulty with chewing .      Her baseline symptoms have been recalcitrant to oral medications and conservative therapy.  She is here today for an injection of Botox.       GOAL OF PROCEDURE:  The goal of this procedure is to improve volitional motor control and improve chewing and closing jaw associated with dystonic movements.     TOTAL DOSE ADMINISTERED:  Dose Administer: 80 units Botox    Diluent Used:  Preservative Free Normal Saline  Total Volume of Diluent Used:  1 mL/100 units     CONSENT:  The risks, benefits, and treatment options were discussed with Kassidy Guajardo and she agreed to proceed.      Written consent was obtained by kg.      EQUIPMENT USED:  Needle-25mm stimulating/recording  EMG/NCS Machine     SKIN PREPARATION:  Skin preparation was performed using an alcohol wipe.     GUIDANCE DESCRIPTION:  Electro-myographic guidance was necessary throughout the procedure to accurately identify all areas of dystonic muscles while avoiding injection of non-dystonic muscles, neighboring nerves and nearby vascular structures.      AREA/MUSCLE INJECTED:    Muscles Injected Units Injected Number of Injections   Left lateral pterygoid 30 (30) 1   Left digastric, anterior belly 10 (10) 1           Right lateral pterygoid 30 (30) 1   Right digastric, anterior belly 10 (10) 1           Total Units Injected: 80     Unavoidable Waste: 20     Total Units Billed 100       ( ) = previous dose    The patient tolerated the injections without difficulty.    Assessment:    Kassidy Guajardo is a 64  year old female with idiopathic oromandibular dystonia, jaw opening type.  Today we did repeat botulinum toxin injections.    Plan  Message via Intrapace or call the clinic in 4-6 weeks for an update  Avoid heat and cold pack and massaging the areas injected for 24 hours post injections  Follow-up in 3 and 6 months to consider repeat injections    Patient seen and discussed with Dr. Mari Stark MD  Neuromodulation/Movement Disorders Fellow                      Attestation signed by Nasreen Guerra MD at 10/24/2024  1:37 PM:  Neurology Attending Attestation:   I personally saw this patient with our neurology fellow and agree with the fellow's findings and plan of care as documented in the fellow's note. I was physically present for the entirety of today's procedure.     Ms. Guajardo is a 64 year old female who presents for neurotoxin injections for oromandibular dystonia. She had good response with her previous dose, so no changes were made today.  She tolerated today's procedure well.  There were no complications.  She did ask about transitioning her care to [facility] and I forwarded a message to our clinic staff to work on rescheduling.  I did encourage her to keep her appointment with Dr. Pierre at the [facility] in January as that clinic is largely booked already.    Nasreen Guerra MD    [facility]  Department of Neurology       Again, thank you for allowing me to participate in the care of your patient.        Sincerely,        Nasreen Guerra MD

## 2024-10-24 NOTE — PATIENT INSTRUCTIONS
__ Message via interclick or call the clinic in 4-6 weeks for an update  __ Avoid heat and cold pack and massaging the areas injected for 24 hours post injections  __ We will place a referral for physical therapy and see if we can route it to the appropriate providers

## 2024-10-24 NOTE — NURSING NOTE
Chief Complaint   Patient presents with    Procedure     JOZEF Christian MA on 10/24/2024 at 9:57 AM

## 2024-10-25 ENCOUNTER — TELEPHONE (OUTPATIENT)
Dept: NEUROLOGY | Facility: CLINIC | Age: 64
End: 2024-10-25

## 2024-10-25 NOTE — TELEPHONE ENCOUNTER
Left message to assist patient with reschedule upcoming Botox treatments to Dr. Guerra at the [facility]:      reschedule her appt's to [facility] for April and July.  You may schedule her for 4/28 and 7/28 with Dr. Guerra.       Patient advised to call [phone] for the purpose of rescheduling these two upcoming appointments. This has been approved by the providers as well.

## 2024-11-02 ENCOUNTER — APPOINTMENT (OUTPATIENT)
Dept: CT IMAGING | Facility: CLINIC | Age: 64
End: 2024-11-02
Attending: STUDENT IN AN ORGANIZED HEALTH CARE EDUCATION/TRAINING PROGRAM
Payer: COMMERCIAL

## 2024-11-02 ENCOUNTER — APPOINTMENT (OUTPATIENT)
Dept: MRI IMAGING | Facility: CLINIC | Age: 64
End: 2024-11-02
Attending: STUDENT IN AN ORGANIZED HEALTH CARE EDUCATION/TRAINING PROGRAM
Payer: COMMERCIAL

## 2024-11-02 ENCOUNTER — HOSPITAL ENCOUNTER (OUTPATIENT)
Facility: CLINIC | Age: 64
Setting detail: OBSERVATION
Discharge: HOME | End: 2024-11-03
Attending: STUDENT IN AN ORGANIZED HEALTH CARE EDUCATION/TRAINING PROGRAM | Admitting: INTERNAL MEDICINE
Payer: COMMERCIAL

## 2024-11-02 DIAGNOSIS — G24.4 OROMANDIBULAR DYSTONIA: ICD-10-CM

## 2024-11-02 DIAGNOSIS — R41.82 ALTERED MENTAL STATUS, UNSPECIFIED ALTERED MENTAL STATUS TYPE: ICD-10-CM

## 2024-11-02 LAB
ALBUMIN UR-MCNC: NEGATIVE MG/DL
AMPHETAMINES UR QL SCN: ABNORMAL
ANION GAP SERPL CALCULATED.3IONS-SCNC: 12 MMOL/L (ref 7–15)
APPEARANCE UR: CLEAR
BARBITURATES UR QL SCN: ABNORMAL
BENZODIAZ UR QL SCN: ABNORMAL
BILIRUB UR QL STRIP: NEGATIVE
BUN SERPL-MCNC: 21.6 MG/DL (ref 8–23)
BZE UR QL SCN: ABNORMAL
CALCIUM SERPL-MCNC: 9 MG/DL (ref 8.8–10.4)
CANNABINOIDS UR QL SCN: ABNORMAL
CHLORIDE SERPL-SCNC: 107 MMOL/L (ref 98–107)
COLOR UR AUTO: ABNORMAL
CREAT SERPL-MCNC: 0.74 MG/DL (ref 0.51–0.95)
EGFRCR SERPLBLD CKD-EPI 2021: 89 ML/MIN/1.73M2
FENTANYL UR QL: ABNORMAL
FLUAV RNA SPEC QL NAA+PROBE: NEGATIVE
FLUBV RNA RESP QL NAA+PROBE: NEGATIVE
GLUCOSE BLDC GLUCOMTR-MCNC: 92 MG/DL (ref 70–99)
GLUCOSE SERPL-MCNC: 97 MG/DL (ref 70–99)
GLUCOSE UR STRIP-MCNC: NEGATIVE MG/DL
HCO3 SERPL-SCNC: 22 MMOL/L (ref 22–29)
HGB UR QL STRIP: NEGATIVE
HOLD SPECIMEN: NORMAL
HOLD SPECIMEN: NORMAL
KETONES UR STRIP-MCNC: NEGATIVE MG/DL
LACTATE SERPL-SCNC: 0.4 MMOL/L (ref 0.7–2)
LEUKOCYTE ESTERASE UR QL STRIP: NEGATIVE
MAGNESIUM SERPL-MCNC: 2.1 MG/DL (ref 1.7–2.3)
MUCOUS THREADS #/AREA URNS LPF: PRESENT /LPF
NITRATE UR QL: NEGATIVE
OPIATES UR QL SCN: ABNORMAL
PCP QUAL URINE (ROCHE): ABNORMAL
PH UR STRIP: 5.5 [PH] (ref 5–7)
POTASSIUM SERPL-SCNC: 4.5 MMOL/L (ref 3.4–5.3)
RBC URINE: <1 /HPF
RSV RNA SPEC NAA+PROBE: NEGATIVE
SARS-COV-2 RNA RESP QL NAA+PROBE: NEGATIVE
SODIUM SERPL-SCNC: 141 MMOL/L (ref 135–145)
SP GR UR STRIP: 1.02 (ref 1–1.03)
SQUAMOUS EPITHELIAL: <1 /HPF
TSH SERPL DL<=0.005 MIU/L-ACNC: 2.41 UIU/ML (ref 0.3–4.2)
UROBILINOGEN UR STRIP-MCNC: NORMAL MG/DL
WBC URINE: <1 /HPF

## 2024-11-02 PROCEDURE — 80048 BASIC METABOLIC PNL TOTAL CA: CPT | Performed by: STUDENT IN AN ORGANIZED HEALTH CARE EDUCATION/TRAINING PROGRAM

## 2024-11-02 PROCEDURE — 70553 MRI BRAIN STEM W/O & W/DYE: CPT

## 2024-11-02 PROCEDURE — 70498 CT ANGIOGRAPHY NECK: CPT

## 2024-11-02 PROCEDURE — 83605 ASSAY OF LACTIC ACID: CPT | Performed by: STUDENT IN AN ORGANIZED HEALTH CARE EDUCATION/TRAINING PROGRAM

## 2024-11-02 PROCEDURE — G0378 HOSPITAL OBSERVATION PER HR: HCPCS

## 2024-11-02 PROCEDURE — 84443 ASSAY THYROID STIM HORMONE: CPT | Performed by: STUDENT IN AN ORGANIZED HEALTH CARE EDUCATION/TRAINING PROGRAM

## 2024-11-02 PROCEDURE — 80307 DRUG TEST PRSMV CHEM ANLYZR: CPT | Performed by: STUDENT IN AN ORGANIZED HEALTH CARE EDUCATION/TRAINING PROGRAM

## 2024-11-02 PROCEDURE — 87637 SARSCOV2&INF A&B&RSV AMP PRB: CPT | Performed by: STUDENT IN AN ORGANIZED HEALTH CARE EDUCATION/TRAINING PROGRAM

## 2024-11-02 PROCEDURE — 255N000002 HC RX 255 OP 636: Performed by: STUDENT IN AN ORGANIZED HEALTH CARE EDUCATION/TRAINING PROGRAM

## 2024-11-02 PROCEDURE — 82962 GLUCOSE BLOOD TEST: CPT

## 2024-11-02 PROCEDURE — 85004 AUTOMATED DIFF WBC COUNT: CPT | Performed by: STUDENT IN AN ORGANIZED HEALTH CARE EDUCATION/TRAINING PROGRAM

## 2024-11-02 PROCEDURE — 99285 EMERGENCY DEPT VISIT HI MDM: CPT | Mod: 25

## 2024-11-02 PROCEDURE — 93005 ELECTROCARDIOGRAM TRACING: CPT

## 2024-11-02 PROCEDURE — 70496 CT ANGIOGRAPHY HEAD: CPT

## 2024-11-02 PROCEDURE — 70450 CT HEAD/BRAIN W/O DYE: CPT

## 2024-11-02 PROCEDURE — 36415 COLL VENOUS BLD VENIPUNCTURE: CPT | Performed by: STUDENT IN AN ORGANIZED HEALTH CARE EDUCATION/TRAINING PROGRAM

## 2024-11-02 PROCEDURE — 81003 URINALYSIS AUTO W/O SCOPE: CPT | Performed by: STUDENT IN AN ORGANIZED HEALTH CARE EDUCATION/TRAINING PROGRAM

## 2024-11-02 PROCEDURE — 83735 ASSAY OF MAGNESIUM: CPT | Performed by: STUDENT IN AN ORGANIZED HEALTH CARE EDUCATION/TRAINING PROGRAM

## 2024-11-02 PROCEDURE — A9585 GADOBUTROL INJECTION: HCPCS | Performed by: STUDENT IN AN ORGANIZED HEALTH CARE EDUCATION/TRAINING PROGRAM

## 2024-11-02 PROCEDURE — 99222 1ST HOSP IP/OBS MODERATE 55: CPT | Performed by: PHYSICIAN ASSISTANT

## 2024-11-02 PROCEDURE — 250N000011 HC RX IP 250 OP 636: Performed by: STUDENT IN AN ORGANIZED HEALTH CARE EDUCATION/TRAINING PROGRAM

## 2024-11-02 PROCEDURE — 93005 ELECTROCARDIOGRAM TRACING: CPT | Mod: 76

## 2024-11-02 RX ORDER — AMOXICILLIN 250 MG
2 CAPSULE ORAL 2 TIMES DAILY PRN
Status: DISCONTINUED | OUTPATIENT
Start: 2024-11-02 | End: 2024-11-03 | Stop reason: HOSPADM

## 2024-11-02 RX ORDER — ACETAMINOPHEN 650 MG/1
650 SUPPOSITORY RECTAL EVERY 4 HOURS PRN
Status: DISCONTINUED | OUTPATIENT
Start: 2024-11-02 | End: 2024-11-03 | Stop reason: HOSPADM

## 2024-11-02 RX ORDER — ONDANSETRON 4 MG/1
4 TABLET, ORALLY DISINTEGRATING ORAL EVERY 6 HOURS PRN
Status: DISCONTINUED | OUTPATIENT
Start: 2024-11-02 | End: 2024-11-03 | Stop reason: HOSPADM

## 2024-11-02 RX ORDER — IBUPROFEN 200 MG
200 TABLET ORAL EVERY 4 HOURS PRN
Status: DISCONTINUED | OUTPATIENT
Start: 2024-11-02 | End: 2024-11-03 | Stop reason: HOSPADM

## 2024-11-02 RX ORDER — AMOXICILLIN 250 MG
1 CAPSULE ORAL 2 TIMES DAILY PRN
Status: DISCONTINUED | OUTPATIENT
Start: 2024-11-02 | End: 2024-11-03 | Stop reason: HOSPADM

## 2024-11-02 RX ORDER — IOPAMIDOL 755 MG/ML
67 INJECTION, SOLUTION INTRAVASCULAR ONCE
Status: COMPLETED | OUTPATIENT
Start: 2024-11-02 | End: 2024-11-02

## 2024-11-02 RX ORDER — LEVOTHYROXINE SODIUM 50 UG/1
50 TABLET ORAL DAILY
Status: DISCONTINUED | OUTPATIENT
Start: 2024-11-03 | End: 2024-11-03 | Stop reason: HOSPADM

## 2024-11-02 RX ORDER — ONDANSETRON 2 MG/ML
4 INJECTION INTRAMUSCULAR; INTRAVENOUS EVERY 6 HOURS PRN
Status: DISCONTINUED | OUTPATIENT
Start: 2024-11-02 | End: 2024-11-03 | Stop reason: HOSPADM

## 2024-11-02 RX ORDER — ACETAMINOPHEN 325 MG/1
650 TABLET ORAL EVERY 4 HOURS PRN
Status: DISCONTINUED | OUTPATIENT
Start: 2024-11-02 | End: 2024-11-03 | Stop reason: HOSPADM

## 2024-11-02 RX ORDER — CODEINE PHOSPHATE AND GUAIFENESIN 10; 100 MG/5ML; MG/5ML
5 SOLUTION ORAL EVERY 6 HOURS PRN
COMMUNITY

## 2024-11-02 RX ORDER — GADOBUTROL 604.72 MG/ML
8 INJECTION INTRAVENOUS ONCE
Status: COMPLETED | OUTPATIENT
Start: 2024-11-02 | End: 2024-11-02

## 2024-11-02 RX ORDER — LEVOTHYROXINE SODIUM 50 UG/1
50 TABLET ORAL DAILY
COMMUNITY
Start: 2024-08-27

## 2024-11-02 RX ADMIN — IOPAMIDOL 67 ML: 755 INJECTION, SOLUTION INTRAVENOUS at 14:00

## 2024-11-02 RX ADMIN — GADOBUTROL 8 ML: 604.72 INJECTION INTRAVENOUS at 15:42

## 2024-11-02 ASSESSMENT — ACTIVITIES OF DAILY LIVING (ADL)
ADLS_ACUITY_SCORE: 0

## 2024-11-02 ASSESSMENT — COLUMBIA-SUICIDE SEVERITY RATING SCALE - C-SSRS
6. HAVE YOU EVER DONE ANYTHING, STARTED TO DO ANYTHING, OR PREPARED TO DO ANYTHING TO END YOUR LIFE?: NO
1. IN THE PAST MONTH, HAVE YOU WISHED YOU WERE DEAD OR WISHED YOU COULD GO TO SLEEP AND NOT WAKE UP?: NO
2. HAVE YOU ACTUALLY HAD ANY THOUGHTS OF KILLING YOURSELF IN THE PAST MONTH?: NO

## 2024-11-02 NOTE — ED NOTES
[facility]  ED Nurse Handoff Report    ED Chief complaint: Altered Mental Status  . ED Diagnosis:   Final diagnoses:   Altered mental status, unspecified altered mental status type       Allergies: No Known Allergies    Code Status: Full Code    Activity level - Baseline/Home:  independent.  Activity Level - Current:   standby.   Lift room needed: No.   Bariatric: No   Needed: No   Isolation: No.   Infection: Not Applicable.     Respiratory status: Room air    Vital Signs (within 30 minutes):   Vitals:    11/02/24 1350 11/02/24 1425 11/02/24 1440 11/02/24 1630   BP:  (!) 154/89  (!) 159/94   Pulse:  72  67   Resp:   13 19   Temp: 98.9  F (37.2  C)      TempSrc: Oral      SpO2:   97% 100%   Weight:           Cardiac Rhythm:  ,      Pain level:    Patient confused: No.   Patient Falls Risk: patient and family education.   Elimination Status: Has voided     Patient Report - Initial Complaint: Altered mental status.   Focused Assessment: Pt arrives with daughter for altered mental status. Last known well was 0900, per daughter confusion has gotten better but there is a period of time during today the pt can't recall anything.     Abnormal Results:   Labs Ordered and Resulted from Time of ED Arrival to Time of ED Departure   ROUTINE UA WITH MICROSCOPIC REFLEX TO CULTURE - Abnormal       Result Value    Color Urine Light Yellow      Appearance Urine Clear      Glucose Urine Negative      Bilirubin Urine Negative      Ketones Urine Negative      Specific Gravity Urine 1.024      Blood Urine Negative      pH Urine 5.5      Protein Albumin Urine Negative      Urobilinogen Urine Normal      Nitrite Urine Negative      Leukocyte Esterase Urine Negative      Mucus Urine Present (*)     RBC Urine <1      WBC Urine <1      Squamous Epithelials Urine <1     CBC WITH PLATELETS AND DIFFERENTIAL - Abnormal    WBC Count 77.5 (*)     RBC Count 4.55      Hemoglobin 11.6 (*)     Hematocrit 38.5      MCV  85      MCH 25.5 (*)     MCHC 30.1 (*)     RDW 15.9 (*)     Platelet Count 140 (*)    RBC AND PLATELET MORPHOLOGY - Abnormal    RBC Morphology Confirmed RBC Indices      Platelet Assessment        Value: Automated Count Confirmed. Platelet morphology is normal.    Elliptocytes Slight (*)     Smudge Cells Present (*)    URINE DRUG SCREEN PANEL - Abnormal    Amphetamines Urine Screen Negative      Barbituates Urine Screen Negative      Benzodiazepine Urine Screen Negative      Cannabinoids Urine Screen Positive (*)     Cocaine Urine Screen Negative      Fentanyl Qual Urine Screen Negative      Opiates Urine Screen Negative      PCP Urine Screen Negative     LACTIC ACID WHOLE BLOOD WITH 1X REPEAT IN 2 HR WHEN >2 - Abnormal    Lactic Acid, Initial 0.4 (*)    BASIC METABOLIC PANEL - Normal    Sodium 141      Potassium 4.5      Chloride 107      Carbon Dioxide (CO2) 22      Anion Gap 12      Urea Nitrogen 21.6      Creatinine 0.74      GFR Estimate 89      Calcium 9.0      Glucose 97     INFLUENZA A/B, RSV, & SARS-COV2 PCR - Normal    Influenza A PCR Negative      Influenza B PCR Negative      RSV PCR Negative      SARS CoV2 PCR Negative     MAGNESIUM - Normal    Magnesium 2.1     TSH WITH FREE T4 REFLEX - Normal    TSH 2.41     GLUCOSE BY METER - Normal    GLUCOSE BY METER POCT 92          MR Brain w/o & w Contrast   Final Result   IMPRESSION:   1.  No evidence of acute intracranial hemorrhage, mass effect, or infarction.      CTA Head Neck with Contrast   Final Result   IMPRESSION:    HEAD CT:   1.  No evidence of acute intracranial hemorrhage or mass effect.      HEAD CTA:    1.  No evidence of pathologic vascular occlusion or saccular aneurysm within the visualized intracranial circulation by CT angiography.      NECK CTA:   1.  No evidence of hemodynamically significant stenosis within either internal carotid artery within the neck.   2.  No evidence of arterial dissection.      CT Head w/o Contrast    (Results  Pending)       Treatments provided: See MAR  Family Comments: family at bedside  OBS brochure/video discussed/provided to patient:  Yes  ED Medications:   Medications   iopamidol (ISOVUE-370) solution 67 mL (67 mLs Intravenous $Given 11/2/24 1400)   sodium chloride (PF) 0.9% PF flush 90 mL (90 mLs Intravenous $Given 11/2/24 1405)   gadobutrol (GADAVIST) injection 8 mL (8 mLs Intravenous $Given 11/2/24 1542)       Drips infusing:  No  For the majority of the shift this patient was Green.   Interventions performed were N/A.    Sepsis treatment initiated: No    Cares/treatment/interventions/medications to be completed following ED care: N/A    ED Nurse Name: Ilene Jauregui RN  6:01 PM  RECEIVING UNIT ED HANDOFF REVIEW    Above ED Nurse Handoff Report was reviewed: Yes  Reviewed by: Jay Linares RN on November 2, 2024 at 9:26 PM   KIKA Del Rio called the ED to inform them the note was read: Yes

## 2024-11-02 NOTE — ED PROVIDER NOTES
Emergency Department Note      History of Present Illness     Chief Complaint   Altered Mental Status      HPI   Kassidy Guajardo is a 64 year old female with a history of CLL who presents to the ED with her daughter and  for evaluation of an altered mental status. The patient's  states she seemed normal from waking until he left their house around 1030 this morning. Her daughter arrived at their house around 1200 and noted she seemed confused. She was unable to recall much of the morning or the night before and was unable to form new memories. Around 1230, she started to be able to recall some events from that morning and form new memories but was still markedly confused. Patient states she last remembers getting breakfast with her  this morning but otherwise does not remember much about this morning or the past week. She has about 1-2 alcoholic drinks per weeks. Denies known recent falls or head injuries. Denies recent cough, congestion, or fever. Denies dysuria, hematuria, smoking, or drug use.    Says that she took a marijuana gummy last night which is not a regular occurrence for her.    Independent Historian    and Daughter as detailed above.    Review of External Notes   I reviewed the urgent care note from 10/24/24 for conjunctivitis and rhinorrhea.    Reviewed the Neurology visit note from 10/24/24 for oromandibular dystonia. She received Botox injections at that time.     Past Medical History     Medical History and Problem List   Hashimoto's thyroiditis  CLL  Oromandibular dystonia     Medications   Levothyroxine  Famotidine  Botox     Surgical History   Herniorrhaphy     Physical Exam     Patient Vitals for the past 24 hrs:   BP Temp Temp src Pulse Resp SpO2 Weight   11/02/24 1630 (!) 159/94 -- -- 67 19 100 % --   11/02/24 1440 -- -- -- -- 13 97 % --   11/02/24 1425 (!) 154/89 -- -- 72 -- -- --   11/02/24 1350 -- 98.9  F (37.2  C) Oral -- -- -- --   11/02/24 1326 (!) 165/106  98.6  F (37  C) -- 79 18 100 % --   11/02/24 1323 -- -- -- -- -- -- 76.3 kg (168 lb 3.4 oz)     Physical Exam  General: Awake, alert, in no acute distress   HEENT: Atraumatic   EOM normal   External ears normal   Trachea midline  Neck: Supple, normal ROM  CV: Regular rate, regular rhythm   No lower extremity edema  2+ radial and DP pulses  PULM: Breath sounds normal bilaterally  No wheezes or rales  ABD: Soft, non-tender, non-distended  Normal bowel sounds   No rebound or guarding   MSK: No gross deformities  NEURO: Alert, no focal deficits 5/5 strength in bilateral upper and lower extremities.  No gross sensory deficits.  Patient moves in a coordinated fashion.  Cranial nerves 2-12 intact.  Cerebellar testing intact. Disoriented to time.  Skin: Warm, dry and intact    Diagnostics     Lab Results   Labs Ordered and Resulted from Time of ED Arrival to Time of ED Departure   ROUTINE UA WITH MICROSCOPIC REFLEX TO CULTURE - Abnormal       Result Value    Color Urine Light Yellow      Appearance Urine Clear      Glucose Urine Negative      Bilirubin Urine Negative      Ketones Urine Negative      Specific Gravity Urine 1.024      Blood Urine Negative      pH Urine 5.5      Protein Albumin Urine Negative      Urobilinogen Urine Normal      Nitrite Urine Negative      Leukocyte Esterase Urine Negative      Mucus Urine Present (*)     RBC Urine <1      WBC Urine <1      Squamous Epithelials Urine <1     CBC WITH PLATELETS AND DIFFERENTIAL - Abnormal    WBC Count 77.5 (*)     RBC Count 4.55      Hemoglobin 11.6 (*)     Hematocrit 38.5      MCV 85      MCH 25.5 (*)     MCHC 30.1 (*)     RDW 15.9 (*)     Platelet Count 140 (*)    RBC AND PLATELET MORPHOLOGY - Abnormal    RBC Morphology Confirmed RBC Indices      Platelet Assessment        Value: Automated Count Confirmed. Platelet morphology is normal.    Elliptocytes Slight (*)     Smudge Cells Present (*)    URINE DRUG SCREEN PANEL - Abnormal    Amphetamines Urine Screen  Negative      Barbituates Urine Screen Negative      Benzodiazepine Urine Screen Negative      Cannabinoids Urine Screen Positive (*)     Cocaine Urine Screen Negative      Fentanyl Qual Urine Screen Negative      Opiates Urine Screen Negative      PCP Urine Screen Negative     LACTIC ACID WHOLE BLOOD WITH 1X REPEAT IN 2 HR WHEN >2 - Abnormal    Lactic Acid, Initial 0.4 (*)    BASIC METABOLIC PANEL - Normal    Sodium 141      Potassium 4.5      Chloride 107      Carbon Dioxide (CO2) 22      Anion Gap 12      Urea Nitrogen 21.6      Creatinine 0.74      GFR Estimate 89      Calcium 9.0      Glucose 97     INFLUENZA A/B, RSV, & SARS-COV2 PCR - Normal    Influenza A PCR Negative      Influenza B PCR Negative      RSV PCR Negative      SARS CoV2 PCR Negative     MAGNESIUM - Normal    Magnesium 2.1     TSH WITH FREE T4 REFLEX - Normal    TSH 2.41     GLUCOSE BY METER - Normal    GLUCOSE BY METER POCT 92         Imaging   MR Brain w/o & w Contrast   Final Result   IMPRESSION:   1.  No evidence of acute intracranial hemorrhage, mass effect, or infarction.      CTA Head Neck with Contrast   Final Result   IMPRESSION:    HEAD CT:   1.  No evidence of acute intracranial hemorrhage or mass effect.      HEAD CTA:    1.  No evidence of pathologic vascular occlusion or saccular aneurysm within the visualized intracranial circulation by CT angiography.      NECK CTA:   1.  No evidence of hemodynamically significant stenosis within either internal carotid artery within the neck.   2.  No evidence of arterial dissection.      CT Head w/o Contrast    (Results Pending)       EKG   ECG results from 11/02/24   EKG 12-lead, tracing only     Value    Systolic Blood Pressure     Diastolic Blood Pressure     Ventricular Rate 73    Atrial Rate 73    AK Interval 174    QRS Duration 94        QTc 436    P Axis 42    R AXIS 23    T Axis 45    Interpretation ECG      Sinus rhythm  Normal ECG  When compared with ECG of 16-Nov-2023  20:32,  No significant change was found           Independent Interpretation   CT head: No intracranial hemorrhage    ED Course      Medications Administered   Medications   iopamidol (ISOVUE-370) solution 67 mL (67 mLs Intravenous $Given 11/2/24 1400)   sodium chloride (PF) 0.9% PF flush 90 mL (90 mLs Intravenous $Given 11/2/24 1405)   gadobutrol (GADAVIST) injection 8 mL (8 mLs Intravenous $Given 11/2/24 1542)       Procedures   Procedures     Discussion of Management   Admitting Hospitalist, Frances Benson PA-C    ED Course   ED Course as of 11/02/24 1814   Sat Nov 02, 2024   1334 I obtained history and performed a physical exam as noted above.    1728 recheck   1754 Spoke with Frances Benson PA-C working with Dr. Nur who accepts admission       Additional Documentation  None    Medical Decision Making / Diagnosis     CMS Diagnoses: None    MIPS       None    MDM   Kassidy Guajardo is a 64 year old female who presents with above history.  Her neurologic exam is reassuring except for mild disorientation, short-term memory difficulty.  Not meeting criteria for tiered stroke.  Went for imaging as above which is fortunately within normal limits.  Electrolytes are WNL.  She does have a marked leukocytosis which is around her baseline in the setting of CLL.  Lactic is WNL, no infectious signs or symptoms.  Very low suspicion for sepsis.    EKG is nonischemic, electrolytes are WNL.  Did just start on Synthroid 1 month ago and TSH today is WNL.  MRI does not show evidence of acute infarct.  My suspicion is for transient global amnesia.  She has an anterograde amnesia.  Patient does not recall meeting me on multiple rechecks.  Given that she is not at her baseline, recommending hospital observation, patient and family agree.  Spoke with the above hospitalist who kindly accepts.    Disposition   The patient was admitted to the hospital.     Diagnosis     ICD-10-CM    1. Altered mental status, unspecified altered mental  status type  R41.82                     Scribe Disclosure:  I, Marcela Kyle, am serving as a scribe at 1:31 PM on 11/2/2024 to document services personally performed by Mary Rodriguez DO based on my observations and the provider's statements to me.        Mary Rodriguez DO  11/02/24 1814

## 2024-11-02 NOTE — ED TRIAGE NOTES
Pt arrives with daughter for altered mental status. Last known well was 0900, per daughter confusion has gotten better but there is a period of time during today the pt can't recall anything.      Triage Assessment (Adult)       Row Name 11/02/24 1325          Triage Assessment    Airway WDL WDL        Respiratory WDL    Respiratory WDL WDL        Cardiac WDL    Cardiac WDL WDL

## 2024-11-03 VITALS
OXYGEN SATURATION: 99 % | WEIGHT: 167.7 LBS | BODY MASS INDEX: 26.32 KG/M2 | HEART RATE: 68 BPM | HEIGHT: 67 IN | DIASTOLIC BLOOD PRESSURE: 96 MMHG | SYSTOLIC BLOOD PRESSURE: 164 MMHG | TEMPERATURE: 98.8 F | RESPIRATION RATE: 18 BRPM

## 2024-11-03 LAB
ANION GAP SERPL CALCULATED.3IONS-SCNC: 11 MMOL/L (ref 7–15)
BUN SERPL-MCNC: 16.7 MG/DL (ref 8–23)
CALCIUM SERPL-MCNC: 8.7 MG/DL (ref 8.8–10.4)
CHLORIDE SERPL-SCNC: 105 MMOL/L (ref 98–107)
CREAT SERPL-MCNC: 0.79 MG/DL (ref 0.51–0.95)
EGFRCR SERPLBLD CKD-EPI 2021: 83 ML/MIN/1.73M2
ERYTHROCYTE [DISTWIDTH] IN BLOOD BY AUTOMATED COUNT: 15.9 % (ref 10–15)
GLUCOSE SERPL-MCNC: 93 MG/DL (ref 70–99)
HCO3 SERPL-SCNC: 24 MMOL/L (ref 22–29)
HCT VFR BLD AUTO: 35.3 % (ref 35–47)
HGB BLD-MCNC: 10.6 G/DL (ref 11.7–15.7)
MCH RBC QN AUTO: 25.3 PG (ref 26.5–33)
MCHC RBC AUTO-ENTMCNC: 30 G/DL (ref 31.5–36.5)
MCV RBC AUTO: 84 FL (ref 78–100)
PLAT MORPH BLD: ABNORMAL
PLATELET # BLD AUTO: 107 10E3/UL (ref 150–450)
POTASSIUM SERPL-SCNC: 4.7 MMOL/L (ref 3.4–5.3)
RBC # BLD AUTO: 4.19 10E6/UL (ref 3.8–5.2)
RBC MORPH BLD: ABNORMAL
SMUDGE CELLS BLD QL SMEAR: PRESENT
SODIUM SERPL-SCNC: 140 MMOL/L (ref 135–145)
VARIANT LYMPHS BLD QL SMEAR: PRESENT
WBC # BLD AUTO: 68.7 10E3/UL (ref 4–11)

## 2024-11-03 PROCEDURE — 80048 BASIC METABOLIC PNL TOTAL CA: CPT | Performed by: PHYSICIAN ASSISTANT

## 2024-11-03 PROCEDURE — 250N000013 HC RX MED GY IP 250 OP 250 PS 637: Performed by: PHYSICIAN ASSISTANT

## 2024-11-03 PROCEDURE — 85018 HEMOGLOBIN: CPT | Performed by: PHYSICIAN ASSISTANT

## 2024-11-03 PROCEDURE — G0378 HOSPITAL OBSERVATION PER HR: HCPCS

## 2024-11-03 PROCEDURE — 99239 HOSP IP/OBS DSCHRG MGMT >30: CPT | Performed by: INTERNAL MEDICINE

## 2024-11-03 PROCEDURE — 82947 ASSAY GLUCOSE BLOOD QUANT: CPT | Performed by: PHYSICIAN ASSISTANT

## 2024-11-03 PROCEDURE — 36415 COLL VENOUS BLD VENIPUNCTURE: CPT | Performed by: PHYSICIAN ASSISTANT

## 2024-11-03 RX ADMIN — LEVOTHYROXINE SODIUM 50 MCG: 0.05 TABLET ORAL at 07:35

## 2024-11-03 ASSESSMENT — ACTIVITIES OF DAILY LIVING (ADL)
ADLS_ACUITY_SCORE: 0

## 2024-11-03 NOTE — PLAN OF CARE
"PRIMARY DIAGNOSIS:   OUTPATIENT/OBSERVATION GOALS TO BE MET BEFORE DISCHARGE:  ADLs back to baseline: Yes    Activity and level of assistance: Ambulating independently.    Pain status: Pain free.    Return to near baseline physical activity: Yes     Discharge Planner Nurse   Safe discharge environment identified: Yes  Barriers to discharge: Yes       Entered by: Jay Linares RN 11/03/2024 4:57 AM     Please review provider order for any additional goals.   Nurse to notify provider when observation goals have been met and patient is ready for discharge.  Goal Outcome Evaluation:      Plan of Care Reviewed With: patient    Overall Patient Progress: improvingOverall Patient Progress: improving    Outcome Evaluation: A&OX4, VSS, denies pain, steady, independent w/activities.sleep/rest.    Problem: Adult Inpatient Plan of Care  Goal: Plan of Care Review  Description: The Plan of Care Review/Shift note should be completed every shift.  The Outcome Evaluation is a brief statement about your assessment that the patient is improving, declining, or no change.  This information will be displayed automatically on your shift  note.  11/3/2024 0450 by Jay Linares RN  Outcome: Progressing  Flowsheets (Taken 11/3/2024 0450)  Outcome Evaluation: A&OX4, VSS, denies pain, steady, independent w/activities.sleep/rest.  Plan of Care Reviewed With: patient  Overall Patient Progress: improving  11/2/2024 2233 by Jay Linares RN  Outcome: Progressing  Flowsheets (Taken 11/2/2024 2233)  Plan of Care Reviewed With: patient  Overall Patient Progress: improving  Goal: Patient-Specific Goal (Individualized)  Description: You can add care plan individualizations to a care plan. Examples of Individualization might be:  \"Parent requests to be called daily at 9am for status\", \"I have a hard time hearing out of my right ear\", or \"Do not touch me to wake me up as it startles  me\".  11/3/2024 0450 by Jay Linares RN  Outcome: " Progressing  11/2/2024 2233 by Jay Linares RN  Outcome: Progressing  Goal: Absence of Hospital-Acquired Illness or Injury  11/3/2024 0450 by Jay Linares RN  Outcome: Progressing  11/2/2024 2233 by Jay Linares RN  Outcome: Progressing  Intervention: Identify and Manage Fall Risk  Recent Flowsheet Documentation  Taken 11/3/2024 0036 by Jay Linares RN  Safety Promotion/Fall Prevention: activity supervised  Goal: Optimal Comfort and Wellbeing  11/3/2024 0450 by Jay Linares RN  Outcome: Progressing  11/2/2024 2233 by Jay Linares RN  Outcome: Progressing  Goal: Readiness for Transition of Care  11/3/2024 0450 by Jay Linares RN  Outcome: Progressing  11/2/2024 2233 by Jay Linares RN  Outcome: Progressing

## 2024-11-03 NOTE — PLAN OF CARE
Goal Outcome Evaluation:      Plan of Care Reviewed With: patient    Overall Patient Progress: improvingOverall Patient Progress: improving    Outcome Evaluation: Pt A&O. Denies pain. Transfers ind. Tolerating reg diet, denies N/V.    Patient's After Visit Summary was reviewed with patient.  Patient verbalized understanding of After Visit Summary, recommended follow up and was given an opportunity to ask questions.   Discharge medications sent home with patient/family: Not applicable - no changes to medications   Discharged with spouse      Problem: Adult Inpatient Plan of Care  Goal: Plan of Care Review  Description: The Plan of Care Review/Shift note should be completed every shift.  The Outcome Evaluation is a brief statement about your assessment that the patient is improving, declining, or no change.  This information will be displayed automatically on your shift  note.  11/3/2024 1041 by Rahel Bunch, RN  Outcome: Met  Flowsheets (Taken 11/3/2024 1041)  Outcome Evaluation: Pt A&O. Denies pain. Transfers ind. Tolerating reg diet, denies N/V.  Plan of Care Reviewed With: patient  Overall Patient Progress: improving  11/3/2024 0857 by Rahel Bunch, RN  Outcome: Progressing  Flowsheets (Taken 11/3/2024 0857)  Plan of Care Reviewed With: patient  Overall Patient Progress: improving  Goal: Absence of Hospital-Acquired Illness or Injury  11/3/2024 1041 by Rahel Bunch, RN  Outcome: Met  11/3/2024 0857 by Rahel Bunch, RN  Outcome: Progressing  Intervention: Identify and Manage Fall Risk  Recent Flowsheet Documentation  Taken 11/3/2024 0728 by Rahel Bunch, RN  Safety Promotion/Fall Prevention:   activity supervised   clutter free environment maintained   increased rounding and observation   increase visualization of patient   lighting adjusted   mobility aid in reach   nonskid shoes/slippers when out of bed   patient and family education   safety round/check completed  Intervention: Prevent  Skin Injury  Recent Flowsheet Documentation  Taken 11/3/2024 0728 by Rahel Bunch, RN  Body Position: position changed independently  Intervention: Prevent Infection  Recent Flowsheet Documentation  Taken 11/3/2024 0728 by Rahel Bunch, RN  Infection Prevention: rest/sleep promoted  Goal: Optimal Comfort and Wellbeing  11/3/2024 1041 by Rahel Bunch, RN  Outcome: Met  11/3/2024 0857 by Rahel Bunch, RN  Outcome: Progressing  Goal: Readiness for Transition of Care  11/3/2024 1041 by Rahel Bunch, RN  Outcome: Met  11/3/2024 0857 by Rahel Bunch, RN  Outcome: Progressing     Problem: Cognitive Impairment  Goal: Optimal Cognitive Function  11/3/2024 1041 by Rahel Bunch, RN  Outcome: Met  11/3/2024 0857 by Rahel Bunch, RN  Outcome: Progressing

## 2024-11-03 NOTE — PROGRESS NOTES
ROOM # 208-1    Living Situation (if not independent, order SW consult):  Facility name: Home w/  : Santiago (Spouse) [phone]    Activity level at baseline: Independent  Activity level on admit: SBA    Who will be transporting you at discharge: Santiago (Spouse)     Patient registered to observation; given Patient Bill of Rights; given the opportunity to ask questions about observation status and their plan of care.  Patient has been oriented to the observation room, bathroom and call light is in place.    Discussed discharge goals and expectations with patient/family.

## 2024-11-03 NOTE — DISCHARGE SUMMARY
[facility]  Discharge Summary  Hospitalist      Date of Admission:  11/2/2024  Date of Discharge:  11/3/2024  Provider:  Anil Nur MD. 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     Hospital Course     Kassidy Guajardo was admitted on 11/2/2024.  The following problems were addressed during her hospitalization:    Brief History of Presentation: The patient, with a history of hypothyroidism, chronic lymphocytic leukemia (CLL), and oral mandibular dystonia, presented to the emergency department with an acute episode of amnesia. Upon evaluation, she was stable, afebrile, and her vital signs included a heart rate of 73 bpm and blood pressure that peaked at 165/106, but stabilized to 125/84. Laboratory tests revealed normal basic metabolic panel, TSH, and glucose levels, with elevated white blood cell count at 77.5, hemoglobin of 11.6, and platelets at 140. A drug screen was positive for cannabis. Imaging studies including CT head, CTA head/neck, and MRI brain were negative for stroke, indicating a diagnosis of transient global amnesia.    Hospital Course: The patient was admitted for observation. Her mental status was closely monitored, and it was noted that she returned to her baseline the following day with only spotty memory of the previous day events. She recalled consuming an edible cannabinoid gummy for chronic jaw pain the night before, which she had used once before without side effects. Given the negative imaging and resolution of  symptoms, transient global amnesia was considered the most likely diagnosis. Blood pressure was monitored throughout her stay with few elevated readings, and she was advised to avoid cannabis due to potential contribution to her symptoms.    Consultations and Recommendations:    Neurology: No immediate neurology consult was pursued. It was advised that she follow up with her neurologist for ongoing management of oral mandibular dystonia.  Primary Care Follow-Up: Recommended for ongoing management of hypothyroidism and hypertension monitoring.    Continue levothyroxine for hypothyroidism.  Monitor blood pressure at home twice daily; follow up with primary care physician to discuss potential initiation of antihypertensive medication if elevated readings persist.  Avoid edible cannabis products.  Follow up with primary care physician for further evaluation and management of hypertension and any recurrence of amnesia symptoms.  Regular follow-up with oncology for CLL management.  Follow-Up: The patient was advised to follow up with her primary care physician and contact a neurologist if symptoms recur or if there are any new concerns. Regular monitoring of blood pressure at home was emphasized due to elevated readings observed during hospitalization.    Pending Results   Unresulted Labs Ordered in the Past 30 Days of this Admission       No orders found for last 31 day(s).            Discharge Orders      Reason for your hospital stay    Transient global amnesia     Follow-up and recommended labs and tests     Follow-up up with primary care physician in 2 to 3 days     Activity    Your activity upon discharge: activity as tolerated     Diet    Follow this diet upon discharge: Current Diet:Orders Placed This Encounter      Regular Diet Adult       Code Status   Full Code       Primary Care Physician   Sachin Garcia PA-C    Physical Exam   Temp: 98.8  F (37.1  C) Temp src: Oral BP: (!) 164/96 Pulse: 68   Resp: 18 SpO2:  99 % O2 Device: None (Room air)    Vitals:    11/02/24 1323 11/02/24 1851 11/02/24 2200   Weight: 76.3 kg (168 lb 3.4 oz) 76.7 kg (169 lb 1.5 oz) 76.1 kg (167 lb 11.2 oz)     Vital Signs with Ranges  Temp:  [97.6  F (36.4  C)-98.9  F (37.2  C)] 98.8  F (37.1  C)  Pulse:  [67-86] 68  Resp:  [9-30] 18  BP: (124-169)/() 164/96  SpO2:  [93 %-100 %] 99 %  No intake/output data recorded.    Constitutional:  alert, cooperative, no apparent distress  Respiratory: No increased work of breathing, good air exchange, no crackles or wheezing.  Cardiovascular: apical impulse,normal S1 and S2  GI: bowel sounds present, soft, non-distended, non-tender      Discharge Disposition   Discharged to home    Consultations This Hospital Stay   CARE MANAGEMENT / SOCIAL WORK IP CONSULT    Time Spent on this Encounter   I, Anil Nur MD, personally saw the patient today and spent greater than 30 minutes discharging this patient.      Discharge Medications   Current Discharge Medication List        CONTINUE these medications which have NOT CHANGED    Details   famotidine (PEPCID) 20 MG tablet Take 20 mg by mouth daily as needed       guaiFENesin-codeine (ROBITUSSIN AC) 100-10 MG/5ML solution Take 5 mLs by mouth every 6 hours as needed for cough.      ibuprofen (ADVIL/MOTRIN) 200 MG tablet Take 200 mg by mouth every 4 hours as needed for mild pain      levothyroxine (SYNTHROID/LEVOTHROID) 50 MCG tablet Take 50 mcg by mouth daily.           Allergies   No Known Allergies  Data   Most Recent 3 CBC's:  Recent Labs   Lab Test 11/03/24  0533 11/02/24  1346 11/16/23  1900   WBC 68.7* 77.5* 111.7*   HGB 10.6* 11.6* 12.0   MCV 84 85 89   * 140* 141*      Most Recent 3 BMP's:  Recent Labs   Lab Test 11/03/24  0533 11/02/24  1347 11/02/24  1346 11/16/23 2058 11/16/23  1900     --  141  --  139   POTASSIUM 4.7  --  4.5 4.0  --    CHLORIDE 105  --  107  --  105   CO2 24  --  22  --  27   BUN 16.7  --  21.6  --  19.6   CR 0.79   --  0.74  --  0.79   ANIONGAP 11  --  12  --  7   LARA 8.7*  --  9.0  --  9.2   GLC 93 92 97  --  98     Most Recent 2 LFT's:  Recent Labs   Lab Test 11/16/23  1900   AST 17   ALT 14   ALKPHOS 57   BILITOTAL 0.5     Most Recent INR's and Anticoagulation Dosing History:  Anticoagulation Dose History           No data to display              Most Recent 3 Troponin's:No lab results found.  Most Recent Cholesterol Panel:No lab results found.  Most Recent 6 Bacteria Isolates From Any Culture (See EPIC Reports for Culture Details):No lab results found.  Most Recent TSH, T4 and A1c Labs:  Recent Labs   Lab Test 11/02/24  1346   TSH 2.41     Results for orders placed or performed during the hospital encounter of 11/02/24   CT Head w/o Contrast    Narrative    EXAM: CT HEAD W/O CONTRAST  LOCATION: [facility]  DATE: 11/2/2024    INDICATION: AMS  COMPARISON: None.  CONTRAST: 67 mL isovue 370  TECHNIQUE: Head and neck CT angiogram with IV contrast. Noncontrast head CT followed by axial helical CT images of the head and neck vessels obtained during the arterial phase of intravenous contrast administration. Axial 2D reconstructed images and   multiplanar 3D MIP reconstructed images of the head and neck vessels were performed by the technologist. Dose reduction techniques were used. All stenosis measurements made according to NASCET criteria unless otherwise specified.    FINDINGS:   NONCONTRAST HEAD CT:   No evidence of acute intracranial hemorrhage or mass effect. Brain attenuation and morphology are normal. The ventricles and sulci are normal for age. Normal gray-white matter differentiation. The basilar cisterns are patent.    The globes are unremarkable. The partially imaged paranasal sinuses, mastoid air cells and middle ear cavities are unremarkable. The visualized skull base and calvarium are unremarkable.    HEAD CTA:  Examination of the anterior circulation demonstrates flow within the bilateral  internal carotid and proximal aspects of the anterior middle cerebral arteries. The anterior communicating artery is demonstrated.    Examination of the posterior circulation demonstrates flow within the distal vertebral, basilar, and proximal aspects of the posterior cerebral arteries. The right and left posterior communicating arteries are not demonstrated with certainty.    No evidence of pathologic vascular occlusion or saccular aneurysm within the visualized intracranial circulation by CT angiography.    NECK CTA:  The aortic arch has normal branching configuration. There is flow within the brachiocephalic, common carotid, and proximal aspects of the subclavian arteries.    The right common carotid artery is patent. Examination of the right carotid bulb demonstrates calcified and atherosclerotic plaque without evidence of hemodynamically significant stenosis within the right internal carotid artery within the neck.    The left common carotid artery is patent. Examination of the left carotid bulb demonstrates no evidence of hemodynamically significant stenosis within the left internal carotid artery within the neck.    The right and left vertebral arteries are patent.    The parotid and submandibular glands are unremarkable. Enlarged bilateral thyroid nodules.    The globes are unremarkable. The partially imaged paranasal sinuses and mastoid air cells are unremarkable.    The partially imaged lung apices are unremarkable.    No suspicious osseous lesions.      Impression    IMPRESSION:   HEAD CT:  1.  No evidence of acute intracranial hemorrhage or mass effect.    HEAD CTA:   1.  No evidence of pathologic vascular occlusion or saccular aneurysm within the visualized intracranial circulation by CT angiography.    NECK CTA:  1.  No evidence of hemodynamically significant stenosis within either internal carotid artery within the neck.  2.  No evidence of arterial dissection.   CTA Head Neck with Contrast     Narrative    EXAM: CTA HEAD NECK W CONTRAST  LOCATION: [facility]  DATE: 11/2/2024    INDICATION: AMS  COMPARISON: None.  CONTRAST: 67 mL isovue 370  TECHNIQUE: Head and neck CT angiogram with IV contrast. Noncontrast head CT followed by axial helical CT images of the head and neck vessels obtained during the arterial phase of intravenous contrast administration. Axial 2D reconstructed images and   multiplanar 3D MIP reconstructed images of the head and neck vessels were performed by the technologist. Dose reduction techniques were used. All stenosis measurements made according to NASCET criteria unless otherwise specified.    FINDINGS:   NONCONTRAST HEAD CT:   No evidence of acute intracranial hemorrhage or mass effect. Brain attenuation and morphology are normal. The ventricles and sulci are normal for age. Normal gray-white matter differentiation. The basilar cisterns are patent.    The globes are unremarkable. The partially imaged paranasal sinuses, mastoid air cells and middle ear cavities are unremarkable. The visualized skull base and calvarium are unremarkable.    HEAD CTA:  Examination of the anterior circulation demonstrates flow within the bilateral internal carotid and proximal aspects of the anterior middle cerebral arteries. The anterior communicating artery is demonstrated.    Examination of the posterior circulation demonstrates flow within the distal vertebral, basilar, and proximal aspects of the posterior cerebral arteries. The right and left posterior communicating arteries are not demonstrated with certainty.    No evidence of pathologic vascular occlusion or saccular aneurysm within the visualized intracranial circulation by CT angiography.    NECK CTA:  The aortic arch has normal branching configuration. There is flow within the brachiocephalic, common carotid, and proximal aspects of the subclavian arteries.    The right common carotid artery is patent. Examination of  the right carotid bulb demonstrates calcified and atherosclerotic plaque without evidence of hemodynamically significant stenosis within the right internal carotid artery within the neck.    The left common carotid artery is patent. Examination of the left carotid bulb demonstrates no evidence of hemodynamically significant stenosis within the left internal carotid artery within the neck.    The right and left vertebral arteries are patent.    The parotid and submandibular glands are unremarkable. Enlarged bilateral thyroid nodules.    The globes are unremarkable. The partially imaged paranasal sinuses and mastoid air cells are unremarkable.    The partially imaged lung apices are unremarkable.    No suspicious osseous lesions.      Impression    IMPRESSION:   HEAD CT:  1.  No evidence of acute intracranial hemorrhage or mass effect.    HEAD CTA:   1.  No evidence of pathologic vascular occlusion or saccular aneurysm within the visualized intracranial circulation by CT angiography.    NECK CTA:  1.  No evidence of hemodynamically significant stenosis within either internal carotid artery within the neck.  2.  No evidence of arterial dissection.   MR Brain w/o & w Contrast    Narrative    EXAM: MR BRAIN W/O and W CONTRAST  LOCATION: [facility]  DATE: 11/2/2024    INDICATION: AMS  COMPARISON: None.  CONTRAST: 8mL Gadavist  TECHNIQUE: Routine multiplanar multisequence head MRI without and with intravenous contrast.    FINDINGS:  INTRACRANIAL CONTENTS: No abnormal restricted diffusion to suggest acute infarct. Brain signal and morphology are normal. The ventricles and sulci are normal for age. No evidence of acute intracranial hemorrhage, mass effect, or extra-axial collection.   Left frontal developmental venous anomaly. No other evidence of abnormal brain parenchymal or leptomeningeal enhancement. No cerebellar tonsillar ectopia.     SELLA: No abnormality accounting for technique.    OSSEOUS  STRUCTURES/SOFT TISSUES: The visualized skull base and calvarium are unremarkable. Expected signal voids within the distal vertebral, basilar, and bilateral internal carotid arteries.    ORBITS: No abnormality accounting for technique.     SINUSES/MASTOIDS: Partially imaged paranasal sinuses and mastoid air cells are unremarkable.      Impression    IMPRESSION:  1.  No evidence of acute intracranial hemorrhage, mass effect, or infarction.           Disclaimer: This note consists of symbols derived from keyboarding, dictation and/or voice recognition software. As a result, there may be errors in the script that have gone undetected. Please consider this when interpreting information found in this chart.

## 2024-11-03 NOTE — PLAN OF CARE
"Goal Outcome Evaluation:      Plan of Care Reviewed With: patient    Overall Patient Progress: improvingOverall Patient Progress: improving    Problem: Adult Inpatient Plan of Care  Goal: Plan of Care Review  Description: Vitals are Temp: 97.6  F (36.4  C) Temp src: Oral BP: (!) 151/88  asymptomatic,baseline, not treating tonight per provider note. Pulse: 80   Resp: 17 SpO2: 98 %.  Patient is Alert and Oriented x4.  SBA with no assistive devices .Pt is a Regular diet. denying pain.  Patient is Saline locked.  note.  Outcome: Progressing  Flowsheets (Taken 11/2/2024 2233)  Plan of Care Reviewed With: patient  Overall Patient Progress: improving  Goal: Patient-Specific Goal (Individualized)  Description: You can add care plan individualizations to a care plan. Examples of Individualization might be:  \"Parent requests to be called daily at 9am for status\", \"I have a hard time hearing out of my right ear\", or \"Do not touch me to wake me up as it startles  me\".  Outcome: Progressing  Goal: Absence of Hospital-Acquired Illness or Injury  Outcome: Progressing  Goal: Optimal Comfort and Wellbeing  Outcome: Progressing  Goal: Readiness for Transition of Care  Outcome: Progressing       "

## 2024-11-03 NOTE — H&P
[facility]    History and Physical - Hospitalist Service       Date of Admission:  11/2/2024    Assessment & Plan      Kassidy Guajardo is a 64 year old female with history of hypothyroidism, CLL and oral mandibular dystonia admitted on 11/2/2024 with episode of amnesia.    In the ED she was afebrile with heart rate of 73, pressure as high as 165/106 but currently 142/84 and breathing comfortably on room air without hypoxia.  Lab work is remarkable for normal BMP, normal TSH, glucose 92, WBC 77.5, hemoglobin 11.6 and platelet count 140.  COVID/influenza/RSV was negative.  Drug screen was negative except for cannabis.  CT head was negative and CTA of the head/neck did not show any significant stenosis.  MRI of the brain was negative for stroke.  Observation admission was requested    # Altered mental status suspect transient global amnesia  -Patient was in her usual state of health on 11/1 and early this a.m. but sometime in the later morning became confused and stopped forming new memories.  She has little recollection of most of the day but does recall going into the MRI scanner at the hospital  -No head trauma or current illness.  She did take a THC gummy last night but this is never happened before when she is used them before.  Her drug screen is otherwise negative and only other new med is levothyroxine.  -CT head, CTA head/neck and MRI brain are negative for stroke  -Symptoms seem consistent with transient global amnesia  -Patient and her spouse are concerned that maybe her blood pressure has been too high recently and may be interested in starting a blood pressure medicine at time of discharge  -I offered neurology consultation which she deferred at this time, she will follow-up with her neurologist who gives her Botox injections for her oral mandibular dystonia    # Hypothyroidism  -Continue levothyroxine    #Untreated HTN  -Will not treat tonight  -Patient reports readings at home that  "are rather high in the 150/90 range.  Consider starting a blood pressure medicine at discharge    #CLL  -From what I can see on chart review follows regular with [facility] and has never had treatment              Diet:  Regular diet  DVT Prophylaxis: Ambulate every shift  Franklin Catheter: Not present  Lines: None     Cardiac Monitoring: None  Code Status:  Full code    Clinically Significant Risk Factors Present on Admission                           # Overweight: Estimated body mass index is 26.48 kg/m  as calculated from the following:    Height as of this encounter: 1.702 m (5' 7\").    Weight as of this encounter: 76.7 kg (169 lb 1.5 oz).              Disposition Plan     Medically Ready for Discharge: Anticipated Tomorrow         The patient's care was discussed with the Patient, Patient's Family, and ED Consultant(s).    Paulette Benson PA-C  Hospitalist Service  [facility]  Securely message with [employer] (more info)  Text page via Sticky Paging/Directory     ______________________________________________________________________    Chief Complaint   Amnesia    History is obtained from the patient and the patient's spouse    History of Present Illness   Kassidy Guajardo is a 64 year old female who was in her usual state of health yesterday.  She also was in her normal state of health early this morning but then her  left at 1030 and when their daughter arrived at their house around noon she was confused and had not perform the activity (yard raking) she was supposed to do.  She had a challenging time recalling what she did that morning.  There was no reported slurred speech or unilateral weakness but apparently her gait was a little off.  She did have a weed gummy the night before but this is not something unusual for her and she has done this before without amnesia she continues to have poor memory of the events earlier today, in the middle of the day and up until she went in " for the MRI of her brain.  She is now starting to regain some memories but does ask me a couple times what she would talk to her neurologist about.  She denies fever, chills, nausea, vomiting, diarrhea, shortness of breath, cough and urinary symptoms.  She recently had a cold and was taking over-the-counter medicines as well as pinkeye and was taking an antibiotic eyedrop.  She drinks alcohol sparingly and does not smoke cigarettes.  She recently started taking levothyroxine      Past Medical History    Past Medical History:   Diagnosis Date    CLL (chronic lymphocytic leukemia) (H)     Oromandibular dystonia 3/23/2020       Past Surgical History   Past Surgical History:   Procedure Laterality Date    COLONOSCOPY      GI SURGERY      HERNIA REPAIR Right     herniated intestine right       Prior to Admission Medications   Prior to Admission Medications   Prescriptions Last Dose Informant Patient Reported? Taking?   famotidine (PEPCID) 20 MG tablet   Yes No   Sig: Take 20 mg by mouth daily as needed    ibuprofen (ADVIL/MOTRIN) 200 MG tablet   Yes No   Sig: Take 200 mg by mouth every 4 hours as needed for mild pain      Facility-Administered Medications: None          Physical Exam   Vital Signs: Temp: 98.9  F (37.2  C) Temp src: Oral BP: (!) 142/84 Pulse: 79   Resp: 30 SpO2: 94 %      Weight: 169 lbs 1.49 oz    General Appearance: Alert and orientated x 3 but forgetful  Respiratory: CTAB  Cardiovascular: RRR without murmur  GI: Bowel sounds are present without tenderness  Skin: No rash or open sores  Other: No lower extremity swelling    Medical Decision Making       55 MINUTES SPENT BY ME on the date of service doing chart review, history, exam, documentation & further activities per the note.      Data     I have personally reviewed the following data over the past 24 hrs:    77.5 (HH)  \   11.6 (L)   / 140 (L)     141 107 21.6 /  92   4.5 22 0.74 \     TSH: 2.41 T4: N/A A1C: N/A     Procal: N/A CRP: N/A Lactic  Acid: 0.4 (L)         Imaging results reviewed over the past 24 hrs:   Recent Results (from the past 24 hours)   CT Head w/o Contrast    Narrative    EXAM: CT HEAD W/O CONTRAST  LOCATION: [facility]  DATE: 11/2/2024    INDICATION: AMS  COMPARISON: None.  CONTRAST: 67 mL isovue 370  TECHNIQUE: Head and neck CT angiogram with IV contrast. Noncontrast head CT followed by axial helical CT images of the head and neck vessels obtained during the arterial phase of intravenous contrast administration. Axial 2D reconstructed images and   multiplanar 3D MIP reconstructed images of the head and neck vessels were performed by the technologist. Dose reduction techniques were used. All stenosis measurements made according to NASCET criteria unless otherwise specified.    FINDINGS:   NONCONTRAST HEAD CT:   No evidence of acute intracranial hemorrhage or mass effect. Brain attenuation and morphology are normal. The ventricles and sulci are normal for age. Normal gray-white matter differentiation. The basilar cisterns are patent.    The globes are unremarkable. The partially imaged paranasal sinuses, mastoid air cells and middle ear cavities are unremarkable. The visualized skull base and calvarium are unremarkable.    HEAD CTA:  Examination of the anterior circulation demonstrates flow within the bilateral internal carotid and proximal aspects of the anterior middle cerebral arteries. The anterior communicating artery is demonstrated.    Examination of the posterior circulation demonstrates flow within the distal vertebral, basilar, and proximal aspects of the posterior cerebral arteries. The right and left posterior communicating arteries are not demonstrated with certainty.    No evidence of pathologic vascular occlusion or saccular aneurysm within the visualized intracranial circulation by CT angiography.    NECK CTA:  The aortic arch has normal branching configuration. There is flow within the  brachiocephalic, common carotid, and proximal aspects of the subclavian arteries.    The right common carotid artery is patent. Examination of the right carotid bulb demonstrates calcified and atherosclerotic plaque without evidence of hemodynamically significant stenosis within the right internal carotid artery within the neck.    The left common carotid artery is patent. Examination of the left carotid bulb demonstrates no evidence of hemodynamically significant stenosis within the left internal carotid artery within the neck.    The right and left vertebral arteries are patent.    The parotid and submandibular glands are unremarkable. Enlarged bilateral thyroid nodules.    The globes are unremarkable. The partially imaged paranasal sinuses and mastoid air cells are unremarkable.    The partially imaged lung apices are unremarkable.    No suspicious osseous lesions.      Impression    IMPRESSION:   HEAD CT:  1.  No evidence of acute intracranial hemorrhage or mass effect.    HEAD CTA:   1.  No evidence of pathologic vascular occlusion or saccular aneurysm within the visualized intracranial circulation by CT angiography.    NECK CTA:  1.  No evidence of hemodynamically significant stenosis within either internal carotid artery within the neck.  2.  No evidence of arterial dissection.   CTA Head Neck with Contrast    Narrative    EXAM: CTA HEAD NECK W CONTRAST  LOCATION: [facility]  DATE: 11/2/2024    INDICATION: AMS  COMPARISON: None.  CONTRAST: 67 mL isovue 370  TECHNIQUE: Head and neck CT angiogram with IV contrast. Noncontrast head CT followed by axial helical CT images of the head and neck vessels obtained during the arterial phase of intravenous contrast administration. Axial 2D reconstructed images and   multiplanar 3D MIP reconstructed images of the head and neck vessels were performed by the technologist. Dose reduction techniques were used. All stenosis measurements made according to  NASCET criteria unless otherwise specified.    FINDINGS:   NONCONTRAST HEAD CT:   No evidence of acute intracranial hemorrhage or mass effect. Brain attenuation and morphology are normal. The ventricles and sulci are normal for age. Normal gray-white matter differentiation. The basilar cisterns are patent.    The globes are unremarkable. The partially imaged paranasal sinuses, mastoid air cells and middle ear cavities are unremarkable. The visualized skull base and calvarium are unremarkable.    HEAD CTA:  Examination of the anterior circulation demonstrates flow within the bilateral internal carotid and proximal aspects of the anterior middle cerebral arteries. The anterior communicating artery is demonstrated.    Examination of the posterior circulation demonstrates flow within the distal vertebral, basilar, and proximal aspects of the posterior cerebral arteries. The right and left posterior communicating arteries are not demonstrated with certainty.    No evidence of pathologic vascular occlusion or saccular aneurysm within the visualized intracranial circulation by CT angiography.    NECK CTA:  The aortic arch has normal branching configuration. There is flow within the brachiocephalic, common carotid, and proximal aspects of the subclavian arteries.    The right common carotid artery is patent. Examination of the right carotid bulb demonstrates calcified and atherosclerotic plaque without evidence of hemodynamically significant stenosis within the right internal carotid artery within the neck.    The left common carotid artery is patent. Examination of the left carotid bulb demonstrates no evidence of hemodynamically significant stenosis within the left internal carotid artery within the neck.    The right and left vertebral arteries are patent.    The parotid and submandibular glands are unremarkable. Enlarged bilateral thyroid nodules.    The globes are unremarkable. The partially imaged paranasal sinuses  and mastoid air cells are unremarkable.    The partially imaged lung apices are unremarkable.    No suspicious osseous lesions.      Impression    IMPRESSION:   HEAD CT:  1.  No evidence of acute intracranial hemorrhage or mass effect.    HEAD CTA:   1.  No evidence of pathologic vascular occlusion or saccular aneurysm within the visualized intracranial circulation by CT angiography.    NECK CTA:  1.  No evidence of hemodynamically significant stenosis within either internal carotid artery within the neck.  2.  No evidence of arterial dissection.   MR Brain w/o & w Contrast    Narrative    EXAM: MR BRAIN W/O and W CONTRAST  LOCATION: [facility]  DATE: 11/2/2024    INDICATION: AMS  COMPARISON: None.  CONTRAST: 8mL Gadavist  TECHNIQUE: Routine multiplanar multisequence head MRI without and with intravenous contrast.    FINDINGS:  INTRACRANIAL CONTENTS: No abnormal restricted diffusion to suggest acute infarct. Brain signal and morphology are normal. The ventricles and sulci are normal for age. No evidence of acute intracranial hemorrhage, mass effect, or extra-axial collection.   Left frontal developmental venous anomaly. No other evidence of abnormal brain parenchymal or leptomeningeal enhancement. No cerebellar tonsillar ectopia.     SELLA: No abnormality accounting for technique.    OSSEOUS STRUCTURES/SOFT TISSUES: The visualized skull base and calvarium are unremarkable. Expected signal voids within the distal vertebral, basilar, and bilateral internal carotid arteries.    ORBITS: No abnormality accounting for technique.     SINUSES/MASTOIDS: Partially imaged paranasal sinuses and mastoid air cells are unremarkable.      Impression    IMPRESSION:  1.  No evidence of acute intracranial hemorrhage, mass effect, or infarction.

## 2024-11-03 NOTE — PLAN OF CARE
Goal Outcome Evaluation:      Plan of Care Reviewed With: patient    Overall Patient Progress: improvingOverall Patient Progress: improving      PRIMARY DIAGNOSIS: AMS/Amnesia  OUTPATIENT/OBSERVATION GOALS TO BE MET BEFORE DISCHARGE:  ADLs back to baseline: Yes    Activity and level of assistance: Ambulating independently.    Pain status: Pain free.    Return to near baseline physical activity: Yes     Discharge Planner Nurse   Safe discharge environment identified: Yes  Barriers to discharge: No       Entered by: Rahel Brandt RN 11/03/2024 8:58 AM     Please review provider order for any additional goals.   Nurse to notify provider when observation goals have been met and patient is ready for discharge.

## 2024-11-03 NOTE — PHARMACY-ADMISSION MEDICATION HISTORY
Pharmacy Intern Admission Medication History    Admission medication history is complete. The information provided in this note is only as accurate as the sources available at the time of the update.    Information Source(s): Patient and CareEverywhere/SureScripts via in-person    Pertinent Information: None    Changes made to PTA medication list:  Added: Levothyroxine,  ROBITUSSIN   Deleted: None  Changed: None    Allergies reviewed with patient and updates made in EHR: yes    Medication History Completed By: Anatoliy Pritchett 11/2/2024 7:12 PM    PTA Med List   Medication Sig Last Dose/Taking    famotidine (PEPCID) 20 MG tablet Take 20 mg by mouth daily as needed  Taking As Needed    ibuprofen (ADVIL/MOTRIN) 200 MG tablet Take 200 mg by mouth every 4 hours as needed for mild pain Taking As Needed    levothyroxine (SYNTHROID/LEVOTHROID) 50 MCG tablet Take 50 mcg by mouth daily. 11/2/2024 Morning    guaiFENesin-codeine (ROBITUSSIN AC) 100-10 MG/5ML solution Take 5 mLs by mouth every 6 hours as needed for cough. Taking As Needed

## 2024-11-04 LAB
ATRIAL RATE - MUSE: 73 BPM
BASOPHILS # BLD AUTO: 0.4 10E3/UL (ref 0–0.2)
BASOPHILS NFR BLD AUTO: 1 %
DIASTOLIC BLOOD PRESSURE - MUSE: NORMAL MMHG
ELLIPTOCYTES BLD QL SMEAR: SLIGHT
EOSINOPHIL # BLD AUTO: 0.1 10E3/UL (ref 0–0.7)
EOSINOPHIL NFR BLD AUTO: 0 %
ERYTHROCYTE [DISTWIDTH] IN BLOOD BY AUTOMATED COUNT: 15.9 % (ref 10–15)
HCT VFR BLD AUTO: 38.5 % (ref 35–47)
HGB BLD-MCNC: 11.6 G/DL (ref 11.7–15.7)
IMM GRANULOCYTES # BLD: 0.1 10E3/UL
IMM GRANULOCYTES NFR BLD: 0 %
INTERPRETATION ECG - MUSE: NORMAL
LYMPHOCYTES # BLD AUTO: 68.2 10E3/UL (ref 0.8–5.3)
LYMPHOCYTES NFR BLD AUTO: 88 %
MCH RBC QN AUTO: 25.5 PG (ref 26.5–33)
MCHC RBC AUTO-ENTMCNC: 30.1 G/DL (ref 31.5–36.5)
MCV RBC AUTO: 85 FL (ref 78–100)
MONOCYTES # BLD AUTO: 2.4 10E3/UL (ref 0–1.3)
MONOCYTES NFR BLD AUTO: 3 %
NEUTROPHILS # BLD AUTO: 6.3 10E3/UL (ref 1.6–8.3)
NEUTROPHILS NFR BLD AUTO: 8 %
NRBC # BLD AUTO: 0 10E3/UL
NRBC BLD AUTO-RTO: 0 /100
P AXIS - MUSE: 42 DEGREES
PLAT MORPH BLD: ABNORMAL
PLATELET # BLD AUTO: 140 10E3/UL (ref 150–450)
PR INTERVAL - MUSE: 174 MS
QRS DURATION - MUSE: 94 MS
QT - MUSE: 396 MS
QTC - MUSE: 436 MS
R AXIS - MUSE: 23 DEGREES
RBC # BLD AUTO: 4.55 10E6/UL (ref 3.8–5.2)
RBC MORPH BLD: ABNORMAL
SMUDGE CELLS BLD QL SMEAR: PRESENT
SYSTOLIC BLOOD PRESSURE - MUSE: NORMAL MMHG
T AXIS - MUSE: 45 DEGREES
VENTRICULAR RATE- MUSE: 73 BPM
WBC # BLD AUTO: 77.5 10E3/UL (ref 4–11)

## 2024-11-12 ENCOUNTER — THERAPY VISIT (OUTPATIENT)
Dept: PHYSICAL THERAPY | Facility: CLINIC | Age: 64
End: 2024-11-12
Attending: STUDENT IN AN ORGANIZED HEALTH CARE EDUCATION/TRAINING PROGRAM
Payer: COMMERCIAL

## 2024-11-12 DIAGNOSIS — G24.4 OROMANDIBULAR DYSTONIA: ICD-10-CM

## 2024-11-12 DIAGNOSIS — M26.609 TMJ DYSFUNCTION: Primary | ICD-10-CM

## 2024-11-12 PROCEDURE — 97110 THERAPEUTIC EXERCISES: CPT | Mod: GP | Performed by: PHYSICAL THERAPIST

## 2024-11-12 PROCEDURE — 97112 NEUROMUSCULAR REEDUCATION: CPT | Mod: GP | Performed by: PHYSICAL THERAPIST

## 2024-11-12 PROCEDURE — 97163 PT EVAL HIGH COMPLEX 45 MIN: CPT | Mod: GP | Performed by: PHYSICAL THERAPIST

## 2024-11-12 NOTE — PROGRESS NOTES
PHYSICAL THERAPY EVALUATION  Type of Visit: Evaluation        Fall Risk Screen:  Fall screen completed by: PT  Have you fallen 2 or more times in the past year?: No  Have you fallen and had an injury in the past year?: No  Is patient a fall risk?: No    Subjective         Presenting condition or subjective complaint: (Patient-Rptd) Ormandibular dystonia  Date of onset: 10/24/24 (date of MD referral)    Relevant medical history: (Patient-Rptd) Hearing problems; Thyroid problems   Dates & types of surgery:      Prior diagnostic imaging/testing results: (Patient-Rptd) Video fluoroscopic swallow study     Prior therapy history for the same diagnosis, illness or injury: (Patient-Rptd) Yes (Patient-Rptd) PT at [facility] of dentistry from 8/21 - 3/22. The use of a machine with prongs was used  The dystonia began in 2019.  She had a cracked tooth that was sharp and she was avoiding that area with her tongue.    She has been getting botox injections every 3 months and has been going on for 3 years.  She got into the school of dentistry for treatment for a year.  She then saw a different dentist who was retiring and now is seeing a different dentist.  She then saw a PT that use an electrical devise to help relax the muscles.      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  Hobbies/Interests: (Patient-Rptd) Family time, walking, and baking    Patient goals for therapy: (Patient-Rptd) Relax the jaw muscles    Pain assessment:      Objective   TMJ/TMD  EVALUATION  ADDITIONAL HISTORY:  Parafunctional habits: Clenching  Stressors:  Associate symptoms: neck tightness/soreness  Headache:  initially had them, but no longer gets HA  Exercise routine: she has done alternating isometrics and neck stretches  Symptoms reported:  nothing specific  Dentist: regularly  Joint lock: Joint does not lock    PAIN: Pain Level at Rest: 0/10  Pain Level with Use: 3/10  Pain Location: tightness at B jaw  Pain Quality: tightness and mild ache  Pain Frequency: constant tightness/aching  Pain is Worst: chewing activities - especially harder foods  Pain is Exacerbated By: chewing activities - especially harder foods.  Pain is Relieved By: botox and previous PT, internal ice pack  Pain Progression: improved since recent injections  OBSERVATION/FACIAL SYMMETRY:   POSITIONING:  Pt has no overbite  INTEGUMENTARY:   POSTURE: Rounded shoulders, Forward head   INTRAORAL MOUTH APPLIANCE:   CERVICAL ROM:   (Degrees) Left AROM Right AROM    Cervical Flexion WNL    Cervical Extension WNL (ERP)    Cervical Side bend Min loss (tight) Min loss (tight on R side of neck)    Cervical Rotation Min loss (+ on L side) WNL    Cervical Protrusion     Cervical Retraction Mod loss (tight)    Thoracic Flexion     Thoracic Extension     Thoracic Rotation       Left AROM Left PROM Right AROM Right PROM   Shoulder Flexion       Shoulder Extension       Shoulder Abduction       Shoulder Adduction       Shoulder IR       Shoulder ER       Shoulder Horiz Abduction       Shoulder Horiz Adduction       Pain:   End Feel:   TMJ ROM:    mm Joint Noise Deviation Pain   Opening 36 Without pain Goes to the L and then like an S curve to try to close    Left Lateral Deviation 5 Without pain     Right Lateral Deviation 5 Without pain     Protrusion Very little protrusion Without pain     Retrusion Very little protrusion Without pain       STRENGTH:     Left Right   Mid Trap     Lower Trap     Rhomboid     SPECIAL TESTS:    PALPATION:   JOINT MOBILITY/ACCESSORY MOTION:   TMD FINDINGS:  Bite/Occlusion:  Tongue Positioning:  Mucosal Ridging:  Tongue Scalloping:  Accelerated Tooth Wear:  MYOTOMES:   DERMATOMES:   NEURAL TENSION:   SPINAL SEGMENTAL CONCLUSIONS:     Assessment & Plan   CLINICAL IMPRESSIONS  Medical Diagnosis: Oromandibular dystonia    Treatment Diagnosis: TMJ dysfunction   Impression/Assessment: Patient is a 64 year old female with jaw stiffness complaints.  The following significant findings have been identified: Pain, Decreased ROM/flexibility, Impaired muscle performance, Decreased activity tolerance, and Impaired posture. These impairments interfere with their ability to perform  chewing food  as compared to previous level of function.     Clinical Decision Making (Complexity):  Clinical Presentation: Unstable/Unpredictable   Clinical Presentation Rationale: based on medical and personal factors listed in PT evaluation  Clinical Decision Making (Complexity): High complexity    PLAN OF CARE  Treatment Interventions:  Modalities: Ultrasound  Interventions: Manual Therapy, Neuromuscular Re-education, Therapeutic Activity, Therapeutic Exercise    Long Term Goals     PT Goal 1  Goal Identifier: opening/closing of mouth  Goal Description: Pt will be able to open her mouth to >40 mm with controlled closing in order to eat a burger or sandwich without having to cut it in little pieces first.  Target Date: 01/07/25      Frequency of Treatment: 1x/week  Duration of Treatment: 8 weeks    Recommended Referrals to Other Professionals:   Education Assessment:   Learner/Method: No Barriers to Learning    Risks and benefits of evaluation/treatment have been explained.   Patient/Family/caregiver agrees with Plan of Care.     Evaluation Time:     PT Eval, High Complexity Minutes (70530): 15       Signing Clinician: Patricia Bhakta PT        [facility]                                                                                    OUTPATIENT PHYSICAL THERAPY      PLAN OF TREATMENT FOR OUTPATIENT REHABILITATION   Patient's Last Name, First Name, Kassidy Shaikh YOB: 1960   Provider's Name   [facility]   Medical Record No.  [id number]     Onset Date: 10/24/24 (date of MD referral)  Start of Care Date: 11/12/24     Medical Diagnosis:  Oromandibular dystonia      PT Treatment Diagnosis:  TMJ dysfunction Plan of Treatment  Frequency/Duration: 1x/week/ 8 weeks    Certification date from 11/12/24 to 01/07/25         See note for plan of treatment details and functional goals     Patricia Bhakta PT                         I CERTIFY THE NEED FOR THESE SERVICES FURNISHED UNDER        THIS PLAN OF TREATMENT AND WHILE UNDER MY CARE     (Physician attestation of this document indicates review and certification of the therapy plan).              Referring Provider:  Jose Miguel Stark    Initial Assessment  See Epic Evaluation- Start of Care Date: 11/12/24

## 2024-12-03 ENCOUNTER — THERAPY VISIT (OUTPATIENT)
Dept: PHYSICAL THERAPY | Facility: CLINIC | Age: 64
End: 2024-12-03
Payer: COMMERCIAL

## 2024-12-03 DIAGNOSIS — M26.609 TMJ DYSFUNCTION: ICD-10-CM

## 2024-12-03 DIAGNOSIS — G24.4 OROMANDIBULAR DYSTONIA: Primary | ICD-10-CM

## 2024-12-03 PROCEDURE — 97110 THERAPEUTIC EXERCISES: CPT | Mod: GP | Performed by: PHYSICAL THERAPIST

## 2024-12-03 PROCEDURE — 97112 NEUROMUSCULAR REEDUCATION: CPT | Mod: GP | Performed by: PHYSICAL THERAPIST

## 2024-12-03 PROCEDURE — 97035 APP MDLTY 1+ULTRASOUND EA 15: CPT | Mod: GP | Performed by: PHYSICAL THERAPIST

## 2024-12-12 ENCOUNTER — TELEPHONE (OUTPATIENT)
Dept: NEUROLOGY | Facility: CLINIC | Age: 64
End: 2024-12-12
Payer: COMMERCIAL

## 2024-12-12 NOTE — TELEPHONE ENCOUNTER
Left Voicemail (1st Attempt) and Sent Mychart (1st Attempt) for the patient to call back and schedule the following:    Appointment type: Return Movement  Provider: Dr. Pierre  Return date: Next avail  Specialty phone number: [phone]  Additional appointment(s) needed: N/A  Additonal Notes:     Please resched the pt. (Next avail and wait list)  Per the provider, ok to schedule as a new pt with their fellow's in [place], Dr. Talbert or Dr. Hutchison

## 2024-12-19 ENCOUNTER — TELEPHONE (OUTPATIENT)
Dept: NEUROLOGY | Facility: CLINIC | Age: 64
End: 2024-12-19
Payer: COMMERCIAL

## 2024-12-19 NOTE — TELEPHONE ENCOUNTER
"Left Voicemail (1st Attempt) and Sent Mychart (1st Attempt) for the patient to call back and schedule the following:    Appointment type: Neurotoxin  Provider: Dr. Pierre/Dr. Guerra  Return date: Next avail  Specialty phone number: [phone]  Additional appointment(s) needed: NA  Additonal Notes:     ** Please assist the pt with rescheduling their Jan. 23, 2025 Neurotoxin appt scheduled with Dr. Pierre. Pt can schedule with Dr. Pierre or Dr. Guerra for that appt, please check all locations.    If pt wishes to cancel their June appt with Dr. Pierre for a \" return movement\" appt, please assist with that as well**  "

## 2024-12-23 ENCOUNTER — TELEPHONE (OUTPATIENT)
Dept: NEUROLOGY | Facility: CLINIC | Age: 64
End: 2024-12-23
Payer: COMMERCIAL

## 2024-12-23 NOTE — TELEPHONE ENCOUNTER
Sent Mychart (1st Attempt) and Left Voicemail (2nd Attempt) for the patient to call back and schedule the following:    Appointment type: Neurotoxin  Provider: Dr. Pierre or Dr. Guerra  Return date: End of Jan or later  Specialty phone number: [phone]  Additional appointment(s) needed: NA  Additonal Notes:

## 2025-01-21 ENCOUNTER — THERAPY VISIT (OUTPATIENT)
Dept: PHYSICAL THERAPY | Facility: CLINIC | Age: 65
End: 2025-01-21
Payer: COMMERCIAL

## 2025-01-21 DIAGNOSIS — G24.4 OROMANDIBULAR DYSTONIA: Primary | ICD-10-CM

## 2025-01-21 DIAGNOSIS — M26.609 TMJ DYSFUNCTION: ICD-10-CM

## 2025-01-21 PROCEDURE — 97035 APP MDLTY 1+ULTRASOUND EA 15: CPT | Mod: GP | Performed by: PHYSICAL THERAPIST

## 2025-01-21 PROCEDURE — 97112 NEUROMUSCULAR REEDUCATION: CPT | Mod: GP | Performed by: PHYSICAL THERAPIST

## 2025-01-21 PROCEDURE — 97110 THERAPEUTIC EXERCISES: CPT | Mod: GP | Performed by: PHYSICAL THERAPIST

## 2025-01-21 NOTE — PROGRESS NOTES
[facility]                                                                                   OUTPATIENT PHYSICAL THERAPY    PLAN OF TREATMENT FOR OUTPATIENT REHABILITATION   Patient's Last Name, First Name, Kassidy Shaikh YOB: 1960   Provider's Name   [facility]   Medical Record No.  [id number]     Onset Date: 10/24/24 (date of MD referral)  Start of Care Date: 11/12/24     Medical Diagnosis:  Oromandibular dystonia      PT Treatment Diagnosis:  TMJ dysfunction Plan of Treatment  Frequency/Duration: 1x/week/ 8 weeks    Certification date from 01/08/25 to 03/04/25         See note for plan of treatment details and functional goals     Patricia Bhakta PT                         I CERTIFY THE NEED FOR THESE SERVICES FURNISHED UNDER        THIS PLAN OF TREATMENT AND WHILE UNDER MY CARE     (Physician attestation of this document indicates review and certification of the therapy plan).              Referring Provider:  Jose Miguel Stark    Initial Assessment  See Epic Evaluation- Start of Care Date: 11/12/24

## 2025-01-28 ENCOUNTER — THERAPY VISIT (OUTPATIENT)
Dept: PHYSICAL THERAPY | Facility: CLINIC | Age: 65
End: 2025-01-28
Payer: COMMERCIAL

## 2025-01-28 ENCOUNTER — TELEPHONE (OUTPATIENT)
Dept: NEUROLOGY | Facility: CLINIC | Age: 65
End: 2025-01-28

## 2025-01-28 DIAGNOSIS — G24.4 OROMANDIBULAR DYSTONIA: Primary | ICD-10-CM

## 2025-01-28 DIAGNOSIS — M26.609 TMJ DYSFUNCTION: ICD-10-CM

## 2025-01-28 PROCEDURE — 97112 NEUROMUSCULAR REEDUCATION: CPT | Mod: GP | Performed by: PHYSICAL THERAPIST

## 2025-01-28 PROCEDURE — 97110 THERAPEUTIC EXERCISES: CPT | Mod: GP | Performed by: PHYSICAL THERAPIST

## 2025-01-28 PROCEDURE — 97035 APP MDLTY 1+ULTRASOUND EA 15: CPT | Mod: GP | Performed by: PHYSICAL THERAPIST

## 2025-01-28 NOTE — TELEPHONE ENCOUNTER
Dr. Pierre.    Patient's Botox order expires/ on 10/31/2024. Can you place in a new Botox order?     Thank you.      SAGE Hernandez on 2025 at 8:39 AM

## 2025-01-29 NOTE — PROGRESS NOTES
DISCHARGE  Reason for Discharge: Pt has made significant progress towards goals and has other health issues to focus on right now.     Equipment Issued: none    Discharge Plan: Patient to continue home program.       01/28/25 0500   Appointment Info   Signing clinician's name / credentials Patricia Bhakta DPT, Cert. MDT   Total/Authorized Visits 8   Visits Used 4   Medical Diagnosis Oromandibular dystonia   PT Tx Diagnosis TMJ dysfunction   Precautions/Limitations h/o chronic lymphocytic leukemia   Progress Note/Certification   Start of Care Date 11/12/24   Onset of illness/injury or Date of Surgery 10/24/24  (date of MD referral)   Therapy Frequency 1x/week   Predicted Duration 8 weeks   Certification date from 01/08/25   Certification date to 03/04/25   Progress Note Completed Date 01/28/25   PT Goal 1   Goal Identifier opening/closing of mouth   Goal Description Pt will be able to open her mouth to >40 mm with controlled closing in order to eat a burger or sandwich without having to cut it in little pieces first.   Goal Progress significant progress - she has met goal for opening ROM and has started to eat sandwiches without tearing them into small pieces - not easy to do but is attempted to do it   Target Date 03/04/25   Subjective Report   Subjective Report Pt reports that she's been doing her HEP consistently over the past week.  She is still having improved movement with opening her mouth.   Objective Measures   Objective Measures Objective Measure 1;Objective Measure 2   Objective Measure 1   Objective Measure lateral deviaiton   Details to L, 5 mm (10 mm after US); to R 5 mm (10 mm after US)   Objective Measure 2   Objective Measure opening   Details 50 mm before and after ultrasound   PT Modalities   PT Modalities Ultrasound   Ultrasound   Ultrasound Minutes (84967) 15   Ultrasound -Type (does not include 3-5 min prep/cleanup time) Continuous   Intensity 1.4   Duration (does not include the 3-5 min set  "up/clean up time) 6 min  (each side)   Frequency 3 MHz   Location B TMJ/massetters   Positioning sitting   Patient Response/Progress improvement in lateral deviation  B   Treatment Interventions (PT)   Interventions Therapeutic Procedure/Exercise;Neuromuscular Re-education   Therapeutic Procedure/Exercise   Therapeutic Procedures: strength, endurance, ROM, flexibility minutes (83742) 10   Therapeutic Procedures Ther Proc 2;Ther Proc 3   Ther Proc 1 neutral position of jaw   Ther Proc 1 - Details rev -practiced by trying to cluck to get tongue in neutral x105 reps and making \"N\" sound x 10 reps - mild difficulty with clucking and cheek puffs due to weakness in muscles.   Ther Proc 2 cheek puffs   Ther Proc 2 - Details rev x10 reps - Educated pt on doing this throughout the day to try to relax muscles - HEP   Ther Proc 3 Pt Ed   Ther Proc 3 - Details Discussed POC - will continue with HEP independently since she has other health issues that she has to focus on right now.   Skilled Intervention VC for form/technique   Patient Response/Progress see above   Neuromuscular Re-education   Neuromuscular re-ed of mvmt, balance, coord, kinesthetic sense, posture, proprioception minutes (28945) 10   Neuromuscular Re-education Neuro Re-ed 2;Neuro Re-ed 3   Neuro Re-ed 1 alternating isometrics   Neuro Re-ed 1 - Details rev - R/L lateral trusion x 10 reps, opening/closing x 10 reps - educated pt on purpose of exercise and to try to do this 6x/day.  Educated pt on initially doing this in slightly parted mouth and eventually progress to doing with more opened mouth.   Neuro Re-ed 2 TMJ rotation and translation control II   Neuro Re-ed 2 - Details rev x10 reps in front of mirror - educated pt on putting one hand on TMJ and one on chin to help guide mouth closed - HEP  6x/day   Neuro Re-ed 3 opening/closing alternating isometrics   Neuro Re-ed 3 - Details see above   Skilled Intervention VC for form/technique   Patient " Response/Progress no pain, mild difficulty with keeping mouth closed and isometric resistance   Education   Learner/Method No Barriers to Learning   Plan   Home program no new exercises   Updates to plan of care d/c today   Plan for next session d/c today   Total Session Time   Timed Code Treatment Minutes 35   Total Treatment Time (sum of timed and untimed services) 35         Referring Provider:  Jose Miguel Stark

## 2025-04-28 ENCOUNTER — OFFICE VISIT (OUTPATIENT)
Dept: NEUROLOGY | Facility: CLINIC | Age: 65
End: 2025-04-28
Payer: COMMERCIAL

## 2025-04-28 DIAGNOSIS — G24.4 OROMANDIBULAR DYSTONIA: Primary | ICD-10-CM

## 2025-04-28 NOTE — LETTER
"4/28/2025      Kassidy Guajardo  [address] [facility] [address]      Dear Colleague,    Thank you for referring your patient, Kassidy Guajardo, to the [facility] NEUROLOGY CLINICS [facility]. Please see a copy of my visit note below.    Movement Disorders Botulinum Toxin Clinic Note    History of Present Illness:  Kassidy Guajardo is a 64 year old female who presents to clinic for botulinum toxin injections for treatment of oromandibular dystonia     Neurotoxin injections are first line gold standard treatment for focal dystonia. This condition was unresponsive to conventional treatment.     Date of last injection: 10/24/24    Onset of effect after injections:  2 weeks    Benefit from last injections: Chewing was easier. She is able to open and close her mouth more naturally when botulinum toxin injections have been working in the past.     Functional improvement: Chewing as above    Wearing off: Skipped last round of injections to assess full efficacy. Fully worn off at this point.     Side effects: She has had difficulty chewing but likely more due to dystonia rather than over-weakness from botulinum toxin.     Additional interval history:   She and Dr. Pierre opted to skip botulinum toxin injections at her last appointment to see what would get worse without botulinum toxin. Her jaw has been more sore. She has been having a lot of periodontal work recently which required her to keep her mouth open. She is planning to keep up with PT exercises.     Her fear has been that the botulinum toxin injections are \"masking\" the problem and causing muscle atrophy.     Patient denies new medical diagnoses, illnesses, hospitalizations, emergency room visits, and injuries since the previous injection with botulinum neurotoxin.    Physical Examination:  Vital Signs:   vitals were not taken for this visit.   Jaw slightly anterior and open.     BOTULINUM NEUROTOXIN INJECTION PROCEDURES:    VERIFICATION OF PATIENT " IDENTIFICATION AND PROCEDURE     Initials   Patient Name KG   Patient  KG   Procedure Verified by: KG     Prior to the start of the procedure and with procedural staff participation, I verbally confirmed the patient s identity using two indicators, relevant allergies, that the procedure was appropriate and matched the consent or emergent situation, and that the correct equipment/implants were available. Immediately prior to starting the procedure I conducted the Time Out with the procedural staff and re-confirmed the patient s name, procedure, and site/side. (The Joint Commission universal protocol was followed.)  Yes    Sedation (Moderate or Deep): None      Above assessments performed by:  Fellow: Nasreen Talbert MD  Provider: Nasreen Guerra MD    INDICATION/S FOR PROCEDURE/S:  Kassidy Guajardo is a 64 year old patient with dystonia affecting the  jaw muscles secondary to a diagnosis of oromandibular dystonia with associated  pain, loss of volitional motor control, and difficulty chewing .     Her baseline symptoms have been recalcitrant to oral medications and conservative therapy.  She is here today for an injection of Botox.      GOAL OF PROCEDURE:  The goal of this procedure is to improve volitional motor control and decrease pain  associated with dystonic movements.    TOTAL DOSE ADMINISTERED:  Dose Administered:  80 units Botox    Diluent Used:  Preservative Free Normal Saline  Total Volume of Diluent Used:  1 mL/100 units    CONSENT:  The risks, benefits, and treatment options were discussed with Kassidy Guajardo and she agreed to proceed.    Written consent was obtained by VELMA.     EQUIPMENT USED:  Needle-25 gauge 2 in stimulating/recording     SKIN PREPARATION:  Skin preparation was performed using an alcohol wipe.    GUIDANCE DESCRIPTION:  Electro-myographic guidance was necessary throughout the procedure to accurately identify all areas of dystonic muscles while avoiding injection of non-dystonic  muscles, neighboring nerves and nearby vascular structures.     AREA/MUSCLE INJECTED:    Muscles Injected Units Injected Number of Injections   L lateral pterygoid  30 1   L anterior digastric 10 1        R lateral pterygoid 30 1   R anterior digastric 10 1        Total Units Injected: 80    Unavoidable Waste: 20    Total Units Billed 100    ( ) = previous dose    The patient tolerated the injections without difficulty.    Assessment:    Kassidy Guajardo is a 64 year old female with oromandibular dystonia.  Today we did repeat botulinum toxin injections. She has opted to continue injections moving forward. The pattern was not changed as she reports good benefit with prior injections.     Plan  Avoid heat and cold pack and massaging the areas injected for 24 hours post injections  Follow-up in 3 and 6 months to consider repeat injections    Injections were performed by movement disorder attending, Dr. Guerra.    Nasreen Talbert MD  Movement Disorders Fellow            Attestation signed by Nasreen Guerra MD at 5/1/2025  6:36 PM:  Neurology Attending Attestation:   I personally saw this patient with our neurology fellow and agree with the fellow's findings and plan of care as documented in the fellow's note. I was physically present for the entirety of today's procedure.     Ms. Guajardo is a 64 year old female who presents for neurotoxin injections for oromandibular dystonia. She tolerated injections well, no changes were made today.    Nasreen Guerra MD    [facility]  Department of Neurology        Again, thank you for allowing me to participate in the care of your patient.        Sincerely,        Nasreen Guerra MD    Electronically signed

## 2025-04-28 NOTE — PROGRESS NOTES
"Movement Disorders Botulinum Toxin Clinic Note    History of Present Illness:  Kassidy Guajardo is a 64 year old female who presents to clinic for botulinum toxin injections for treatment of oromandibular dystonia     Neurotoxin injections are first line gold standard treatment for focal dystonia. This condition was unresponsive to conventional treatment.     Date of last injection: 10/24/24    Onset of effect after injections:  2 weeks    Benefit from last injections: Chewing was easier. She is able to open and close her mouth more naturally when botulinum toxin injections have been working in the past.     Functional improvement: Chewing as above    Wearing off: Skipped last round of injections to assess full efficacy. Fully worn off at this point.     Side effects: She has had difficulty chewing but likely more due to dystonia rather than over-weakness from botulinum toxin.     Additional interval history:   She and Dr. Pierre opted to skip botulinum toxin injections at her last appointment to see what would get worse without botulinum toxin. Her jaw has been more sore. She has been having a lot of periodontal work recently which required her to keep her mouth open. She is planning to keep up with PT exercises.     Her fear has been that the botulinum toxin injections are \"masking\" the problem and causing muscle atrophy.     Patient denies new medical diagnoses, illnesses, hospitalizations, emergency room visits, and injuries since the previous injection with botulinum neurotoxin.    Physical Examination:  Vital Signs:   vitals were not taken for this visit.   Jaw slightly anterior and open.     BOTULINUM NEUROTOXIN INJECTION PROCEDURES:    VERIFICATION OF PATIENT IDENTIFICATION AND PROCEDURE     Initials   Patient Name KG   Patient  KG   Procedure Verified by: KG     Prior to the start of the procedure and with procedural staff participation, I verbally confirmed the patient s identity using two indicators, " relevant allergies, that the procedure was appropriate and matched the consent or emergent situation, and that the correct equipment/implants were available. Immediately prior to starting the procedure I conducted the Time Out with the procedural staff and re-confirmed the patient s name, procedure, and site/side. (The Joint Commission universal protocol was followed.)  Yes    Sedation (Moderate or Deep): None      Above assessments performed by:  Fellow: Nasreen Talbert MD  Provider: Nasreen Guerra MD    INDICATION/S FOR PROCEDURE/S:  Kassidy Guajardo is a 64 year old patient with dystonia affecting the  jaw muscles secondary to a diagnosis of oromandibular dystonia with associated  pain, loss of volitional motor control, and difficulty chewing .     Her baseline symptoms have been recalcitrant to oral medications and conservative therapy.  She is here today for an injection of Botox.      GOAL OF PROCEDURE:  The goal of this procedure is to improve volitional motor control and decrease pain  associated with dystonic movements.    TOTAL DOSE ADMINISTERED:  Dose Administered:  80 units Botox    Diluent Used:  Preservative Free Normal Saline  Total Volume of Diluent Used:  1 mL/100 units    CONSENT:  The risks, benefits, and treatment options were discussed with Kassidy Guajardo and she agreed to proceed.    Written consent was obtained by VELMA.     EQUIPMENT USED:  Needle-25 gauge 2 in stimulating/recording     SKIN PREPARATION:  Skin preparation was performed using an alcohol wipe.    GUIDANCE DESCRIPTION:  Electro-myographic guidance was necessary throughout the procedure to accurately identify all areas of dystonic muscles while avoiding injection of non-dystonic muscles, neighboring nerves and nearby vascular structures.     AREA/MUSCLE INJECTED:    Muscles Injected Units Injected Number of Injections   L lateral pterygoid  30 1   L anterior digastric 10 1        R lateral pterygoid 30 1   R anterior digastric 10 1         Total Units Injected: 80    Unavoidable Waste: 20    Total Units Billed 100    ( ) = previous dose    The patient tolerated the injections without difficulty.    Assessment:    Kassidy Guajardo is a 64 year old female with oromandibular dystonia.  Today we did repeat botulinum toxin injections. She has opted to continue injections moving forward. The pattern was not changed as she reports good benefit with prior injections.     Plan  Avoid heat and cold pack and massaging the areas injected for 24 hours post injections  Follow-up in 3 and 6 months to consider repeat injections    Injections were performed by movement disorder attending, Dr. Guerra.    Nasreen Talbert MD  Movement Disorders Fellow

## 2025-05-24 ENCOUNTER — HEALTH MAINTENANCE LETTER (OUTPATIENT)
Age: 65
End: 2025-05-24

## 2025-06-12 ENCOUNTER — APPOINTMENT (OUTPATIENT)
Dept: URBAN - METROPOLITAN AREA CLINIC 253 | Age: 65
Setting detail: DERMATOLOGY
End: 2025-06-12

## 2025-06-12 VITALS — HEIGHT: 67 IN | RESPIRATION RATE: 14 BRPM | WEIGHT: 165 LBS

## 2025-06-12 DIAGNOSIS — Z71.89 OTHER SPECIFIED COUNSELING: ICD-10-CM

## 2025-06-12 DIAGNOSIS — L84 CORNS AND CALLOSITIES: ICD-10-CM

## 2025-06-12 DIAGNOSIS — L82.1 OTHER SEBORRHEIC KERATOSIS: ICD-10-CM

## 2025-06-12 DIAGNOSIS — D18.0 HEMANGIOMA: ICD-10-CM

## 2025-06-12 DIAGNOSIS — D22 MELANOCYTIC NEVI: ICD-10-CM

## 2025-06-12 DIAGNOSIS — L81.4 OTHER MELANIN HYPERPIGMENTATION: ICD-10-CM

## 2025-06-12 PROBLEM — D18.01 HEMANGIOMA OF SKIN AND SUBCUTANEOUS TISSUE: Status: ACTIVE | Noted: 2025-06-12

## 2025-06-12 PROBLEM — D22.5 MELANOCYTIC NEVI OF TRUNK: Status: ACTIVE | Noted: 2025-06-12

## 2025-06-12 PROCEDURE — OTHER MIPS QUALITY: OTHER

## 2025-06-12 PROCEDURE — 99213 OFFICE O/P EST LOW 20 MIN: CPT

## 2025-06-12 PROCEDURE — OTHER COUNSELING: OTHER

## 2025-06-12 PROCEDURE — OTHER ADDITIONAL NOTES: OTHER

## 2025-06-12 ASSESSMENT — LOCATION DETAILED DESCRIPTION DERM
LOCATION DETAILED: LEFT PLANTAR FOREFOOT OVERLYING 5TH METATARSAL
LOCATION DETAILED: INFERIOR THORACIC SPINE
LOCATION DETAILED: SUPERIOR LUMBAR SPINE

## 2025-06-12 ASSESSMENT — LOCATION ZONE DERM
LOCATION ZONE: FEET
LOCATION ZONE: TRUNK

## 2025-06-12 ASSESSMENT — LOCATION SIMPLE DESCRIPTION DERM
LOCATION SIMPLE: LOWER BACK
LOCATION SIMPLE: LEFT PLANTAR SURFACE
LOCATION SIMPLE: UPPER BACK

## 2025-07-28 ENCOUNTER — OFFICE VISIT (OUTPATIENT)
Dept: NEUROLOGY | Facility: CLINIC | Age: 65
End: 2025-07-28
Payer: COMMERCIAL

## 2025-07-28 DIAGNOSIS — G24.4 OROMANDIBULAR DYSTONIA: Primary | ICD-10-CM

## 2025-07-28 PROCEDURE — 95874 GUIDE NERV DESTR NEEDLE EMG: CPT | Performed by: PSYCHIATRY & NEUROLOGY

## 2025-07-28 PROCEDURE — 64612 DESTROY NERVE FACE MUSCLE: CPT | Mod: 50 | Performed by: PSYCHIATRY & NEUROLOGY

## 2025-07-28 NOTE — LETTER
2025      Kassidy Guajardo  [address] [facility] [address]      Dear Colleague,    Thank you for referring your patient, Kassidy Guajardo, to the [facility] NEUROLOGY CLINICS [facility]. Please see a copy of my visit note below.    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 Neurotoxin injections are first line gold standard treatment for focal dystonia.    Date of last injection: 25    Onset of effect after injections:  2 weeks    Benefit from last injections: Chewing was easier. She is able to open and close her mouth more naturally when botulinum toxin injections have been working in the past. If injections are not working, fully closing the mouth is difficult. This has been better.     Functional improvement: Chewing as above    Wearing off: unsure    Side effects: She has had difficulty chewing but likely more due to dystonia rather than over-weakness from botulinum toxin.     Additional interval history:   Patient denies new medical diagnoses, illnesses, hospitalizations, emergency room visits, and injuries since the previous injection with botulinum neurotoxin.    Physical Examination:  Vital Signs:   vitals were not taken for this visit.   Jaw slightly anterior and open.     BOTULINUM NEUROTOXIN INJECTION PROCEDURES:    VERIFICATION OF PATIENT IDENTIFICATION AND PROCEDURE     Initials   Patient Name KG   Patient  KG   Procedure Verified by: KG     Prior to the start of the procedure and with procedural staff participation, I verbally confirmed the patient s identity using two indicators, relevant allergies, that the procedure was appropriate and matched the consent or emergent situation, and that the correct equipment/implants were available. Immediately prior to starting the procedure I conducted the Time Out with  the procedural staff and re-confirmed the patient s name, procedure, and site/side. (The Joint Commission universal protocol was followed.)  Yes    Sedation (Moderate or Deep): None      Above assessments performed by:  Provider: Nasreen Guerra MD    INDICATION/S FOR PROCEDURE/S:  Kassidy Guajardo is a 65 year old patient with dystonia affecting the  jaw muscles secondary to a diagnosis of oromandibular dystonia with associated  pain, loss of volitional motor control, and difficulty chewing.     Her baseline symptoms have been recalcitrant to oral medications and conservative therapy.  She is here today for an injection of Botox.      GOAL OF PROCEDURE:  The goal of this procedure is to improve volitional motor control and decrease pain  associated with dystonic movements.    TOTAL DOSE ADMINISTERED:  Dose Administered:  70 units Botox    Diluent Used:  Preservative Free Normal Saline  Total Volume of Diluent Used:  1 mL/100 units    CONSENT:  The risks, benefits, and treatment options were discussed with Kassidy Guajardo and she agreed to proceed.    Written consent was obtained by KG.     EQUIPMENT USED:  Needle-25 gauge 2 in stimulating/recording     SKIN PREPARATION:  Skin preparation was performed using an alcohol wipe.    GUIDANCE DESCRIPTION:  Electro-myographic guidance was necessary throughout the procedure to accurately identify all areas of dystonic muscles while avoiding injection of non-dystonic muscles, neighboring nerves and nearby vascular structures.     AREA/MUSCLE INJECTED:    Muscles Injected  Units Injected Number of Injections   L lateral pterygoid  30 (30) 1   L anterior digastric 5 (10) 1        R lateral pterygoid 30 (30) 1   R anterior digastric 5 (10) 1        Total Units Injected: 70    Unavoidable Waste: 30    Total Units Billed 100    ( ) = previous dose    The patient tolerated the injections without difficulty.    Assessment:    Kassidy Guajardo is a 65 year old female with oromandibular  dystonia.  Today we did repeat botulinum toxin injections. She has opted to continue injections moving forward. Due to some concern for swallowing issues, decreased digastric dose. I asked her to write down weekly how her chewing & swallowing has been that week because some of the swallowing may be chronic rather the secondary to neurotoxin.    Plan  Avoid heat and cold pack and massaging the areas injected for 24 hours post injections  Follow-up in 3 and 6 months to consider repeat injections    Nasreen Guerra MD    [facility]  Department of Neurology             Again, thank you for allowing me to participate in the care of your patient.        Sincerely,        Nasreen Guerra MD    Electronically signed

## 2025-07-28 NOTE — PROGRESS NOTES
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 Neurotoxin injections are first line gold standard treatment for focal dystonia.    Date of last injection: 25    Onset of effect after injections:  2 weeks    Benefit from last injections: Chewing was easier. She is able to open and close her mouth more naturally when botulinum toxin injections have been working in the past. If injections are not working, fully closing the mouth is difficult. This has been better.     Functional improvement: Chewing as above    Wearing off: unsure    Side effects: She has had difficulty chewing but likely more due to dystonia rather than over-weakness from botulinum toxin.     Additional interval history:   Patient denies new medical diagnoses, illnesses, hospitalizations, emergency room visits, and injuries since the previous injection with botulinum neurotoxin.    Physical Examination:  Vital Signs:   vitals were not taken for this visit.   Jaw slightly anterior and open.     BOTULINUM NEUROTOXIN INJECTION PROCEDURES:    VERIFICATION OF PATIENT IDENTIFICATION AND PROCEDURE     Initials   Patient Name KG   Patient  KG   Procedure Verified by: KG     Prior to the start of the procedure and with procedural staff participation, I verbally confirmed the patient s identity using two indicators, relevant allergies, that the procedure was appropriate and matched the consent or emergent situation, and that the correct equipment/implants were available. Immediately prior to starting the procedure I conducted the Time Out with the procedural staff and re-confirmed the patient s name, procedure, and site/side. (The Joint Commission universal protocol was followed.)  Yes    Sedation (Moderate or Deep): None      Above assessments performed by:  Provider: Nasreen Guerra  MD    INDICATION/S FOR PROCEDURE/S:  Kassidy Guajardo is a 65 year old patient with dystonia affecting the  jaw muscles secondary to a diagnosis of oromandibular dystonia with associated  pain, loss of volitional motor control, and difficulty chewing.     Her baseline symptoms have been recalcitrant to oral medications and conservative therapy.  She is here today for an injection of Botox.      GOAL OF PROCEDURE:  The goal of this procedure is to improve volitional motor control and decrease pain  associated with dystonic movements.    TOTAL DOSE ADMINISTERED:  Dose Administered:  70 units Botox    Diluent Used:  Preservative Free Normal Saline  Total Volume of Diluent Used:  1 mL/100 units    CONSENT:  The risks, benefits, and treatment options were discussed with Kassidy Guajardo and she agreed to proceed.    Written consent was obtained by KG.     EQUIPMENT USED:  Needle-25 gauge 2 in stimulating/recording     SKIN PREPARATION:  Skin preparation was performed using an alcohol wipe.    GUIDANCE DESCRIPTION:  Electro-myographic guidance was necessary throughout the procedure to accurately identify all areas of dystonic muscles while avoiding injection of non-dystonic muscles, neighboring nerves and nearby vascular structures.     AREA/MUSCLE INJECTED:    Muscles Injected  Units Injected Number of Injections   L lateral pterygoid  30 (30) 1   L anterior digastric 5 (10) 1        R lateral pterygoid 30 (30) 1   R anterior digastric 5 (10) 1        Total Units Injected: 70    Unavoidable Waste: 30    Total Units Billed 100    ( ) = previous dose    The patient tolerated the injections without difficulty.    Assessment:    Kassidy Guajardo is a 65 year old female with oromandibular dystonia.  Today we did repeat botulinum toxin injections. She has opted to continue injections moving forward. Due to some concern for swallowing issues, decreased digastric dose. I asked her to write down weekly how her chewing & swallowing has  been that week because some of the swallowing may be chronic rather the secondary to neurotoxin.    Plan  Avoid heat and cold pack and massaging the areas injected for 24 hours post injections  Follow-up in 3 and 6 months to consider repeat injections    Nasreen Guerra MD    [facility]  Department of Neurology

## (undated) RX ORDER — IBUPROFEN 600 MG/1
TABLET, FILM COATED ORAL
Status: DISPENSED
Start: 2017-01-03